# Patient Record
Sex: FEMALE | Race: BLACK OR AFRICAN AMERICAN | NOT HISPANIC OR LATINO | URBAN - METROPOLITAN AREA
[De-identification: names, ages, dates, MRNs, and addresses within clinical notes are randomized per-mention and may not be internally consistent; named-entity substitution may affect disease eponyms.]

---

## 2019-12-04 ENCOUNTER — OUTPATIENT (OUTPATIENT)
Dept: OUTPATIENT SERVICES | Facility: HOSPITAL | Age: 69
LOS: 1 days | Discharge: HOME | End: 2019-12-04
Payer: OTHER MISCELLANEOUS

## 2019-12-04 VITALS
HEART RATE: 85 BPM | RESPIRATION RATE: 17 BRPM | DIASTOLIC BLOOD PRESSURE: 86 MMHG | WEIGHT: 170.86 LBS | HEIGHT: 63 IN | OXYGEN SATURATION: 98 % | TEMPERATURE: 98 F | SYSTOLIC BLOOD PRESSURE: 150 MMHG

## 2019-12-04 DIAGNOSIS — Z98.890 OTHER SPECIFIED POSTPROCEDURAL STATES: Chronic | ICD-10-CM

## 2019-12-04 DIAGNOSIS — Z01.818 ENCOUNTER FOR OTHER PREPROCEDURAL EXAMINATION: ICD-10-CM

## 2019-12-04 DIAGNOSIS — Z98.49 CATARACT EXTRACTION STATUS, UNSPECIFIED EYE: Chronic | ICD-10-CM

## 2019-12-04 DIAGNOSIS — M19.90 UNSPECIFIED OSTEOARTHRITIS, UNSPECIFIED SITE: ICD-10-CM

## 2019-12-04 LAB
ANION GAP SERPL CALC-SCNC: 6 MMOL/L — SIGNIFICANT CHANGE UP (ref 5–17)
APTT BLD: 30.8 SEC — SIGNIFICANT CHANGE UP (ref 28.5–37)
BASOPHILS # BLD AUTO: 0.02 K/UL — SIGNIFICANT CHANGE UP (ref 0–0.2)
BASOPHILS NFR BLD AUTO: 0.2 % — SIGNIFICANT CHANGE UP (ref 0–2)
BUN SERPL-MCNC: 20 MG/DL — SIGNIFICANT CHANGE UP (ref 7–23)
CALCIUM SERPL-MCNC: 9.5 MG/DL — SIGNIFICANT CHANGE UP (ref 8.5–10.1)
CHLORIDE SERPL-SCNC: 106 MMOL/L — SIGNIFICANT CHANGE UP (ref 96–108)
CO2 SERPL-SCNC: 30 MMOL/L — SIGNIFICANT CHANGE UP (ref 22–31)
CREAT SERPL-MCNC: 1.06 MG/DL — SIGNIFICANT CHANGE UP (ref 0.5–1.3)
EOSINOPHIL # BLD AUTO: 0.33 K/UL — SIGNIFICANT CHANGE UP (ref 0–0.5)
EOSINOPHIL NFR BLD AUTO: 4.1 % — SIGNIFICANT CHANGE UP (ref 0–6)
GLUCOSE SERPL-MCNC: 120 MG/DL — HIGH (ref 70–99)
HBA1C BLD-MCNC: 6 % — HIGH (ref 4–5.6)
HCT VFR BLD CALC: 39.1 % — SIGNIFICANT CHANGE UP (ref 34.5–45)
HCV AB S/CO SERPL IA: 0.08 S/CO — SIGNIFICANT CHANGE UP (ref 0–0.99)
HCV AB SERPL-IMP: SIGNIFICANT CHANGE UP
HGB BLD-MCNC: 12.4 G/DL — SIGNIFICANT CHANGE UP (ref 11.5–15.5)
IMM GRANULOCYTES NFR BLD AUTO: 0.4 % — SIGNIFICANT CHANGE UP (ref 0–1.5)
INR BLD: 0.98 RATIO — SIGNIFICANT CHANGE UP (ref 0.88–1.16)
LYMPHOCYTES # BLD AUTO: 2.77 K/UL — SIGNIFICANT CHANGE UP (ref 1–3.3)
LYMPHOCYTES # BLD AUTO: 34.4 % — SIGNIFICANT CHANGE UP (ref 13–44)
MCHC RBC-ENTMCNC: 26.8 PG — LOW (ref 27–34)
MCHC RBC-ENTMCNC: 31.7 GM/DL — LOW (ref 32–36)
MCV RBC AUTO: 84.6 FL — SIGNIFICANT CHANGE UP (ref 80–100)
MONOCYTES # BLD AUTO: 0.78 K/UL — SIGNIFICANT CHANGE UP (ref 0–0.9)
MONOCYTES NFR BLD AUTO: 9.7 % — SIGNIFICANT CHANGE UP (ref 2–14)
MRSA PCR RESULT.: SIGNIFICANT CHANGE UP
NEUTROPHILS # BLD AUTO: 4.13 K/UL — SIGNIFICANT CHANGE UP (ref 1.8–7.4)
NEUTROPHILS NFR BLD AUTO: 51.2 % — SIGNIFICANT CHANGE UP (ref 43–77)
NRBC # BLD: 0 /100 WBCS — SIGNIFICANT CHANGE UP (ref 0–0)
PLATELET # BLD AUTO: 223 K/UL — SIGNIFICANT CHANGE UP (ref 150–400)
POTASSIUM SERPL-MCNC: 3.3 MMOL/L — LOW (ref 3.5–5.3)
POTASSIUM SERPL-SCNC: 3.3 MMOL/L — LOW (ref 3.5–5.3)
PROTHROM AB SERPL-ACNC: 11 SEC — SIGNIFICANT CHANGE UP (ref 10–12.9)
RBC # BLD: 4.62 M/UL — SIGNIFICANT CHANGE UP (ref 3.8–5.2)
RBC # FLD: 15.5 % — HIGH (ref 10.3–14.5)
S AUREUS DNA NOSE QL NAA+PROBE: SIGNIFICANT CHANGE UP
SODIUM SERPL-SCNC: 142 MMOL/L — SIGNIFICANT CHANGE UP (ref 135–145)
WBC # BLD: 8.06 K/UL — SIGNIFICANT CHANGE UP (ref 3.8–10.5)
WBC # FLD AUTO: 8.06 K/UL — SIGNIFICANT CHANGE UP (ref 3.8–10.5)

## 2019-12-04 PROCEDURE — 93010 ELECTROCARDIOGRAM REPORT: CPT

## 2019-12-04 NOTE — PHYSICAL THERAPY INITIAL EVALUATION ADULT - MODIFIED CLINICAL TEST OF SENSORY INTEGRATION IN BALANCE TEST
5x Sit to Stand Test : 45 seconds, 2 Minute Walk Test : 120 feet with lofstran crutches and R knee brace.

## 2019-12-04 NOTE — PHYSICAL THERAPY INITIAL EVALUATION ADULT - CRITERIA FOR SKILLED THERAPEUTIC INTERVENTIONS
functional limitations in following categories/risk reduction/prevention/therapy frequency/impairments found/rehab potential/anticipated discharge recommendation/anticipated equipment needs at discharge

## 2019-12-04 NOTE — PHYSICAL THERAPY INITIAL EVALUATION ADULT - GAIT DEVIATIONS NOTED, PT EVAL
increased time in double stance/decreased stride length/decreased velocity of limb motion/decreased weight-shifting ability/decreased step length/decreased tom

## 2019-12-04 NOTE — H&P PST ADULT - NSANTHOSAYNRD_GEN_A_CORE
No. LAKESHIA screening performed.  STOP BANG Legend: 0-2 = LOW Risk; 3-4 = INTERMEDIATE Risk; 5-8 = HIGH Risk

## 2019-12-04 NOTE — H&P PST ADULT - HISTORY OF PRESENT ILLNESS
70 yo female c/o right knee pain secondary to work injury 4/2019 - patient ambulates with crutches - scheduled for right knee arthroplasty  Goal: to be able to walk straight

## 2019-12-04 NOTE — PHYSICAL THERAPY INITIAL EVALUATION ADULT - IMPAIRMENTS FOUND, PT EVAL
ergonomics and body mechanics/posture/aerobic capacity/endurance/gait, locomotion, and balance/muscle strength/ROM

## 2019-12-04 NOTE — H&P PST ADULT - NSICDXPROBLEM_GEN_ALL_CORE_FT
PROBLEM DIAGNOSES  Problem: Osteoarthritis  Assessment and Plan: scheduled for right knee arthroplasty    Problem: Preoperative examination  Assessment and Plan: labs - cbc,pt/ptt,bmp,t&s,nose cx,ekg  M/C required  preop 3 day hibiclens instruction reviewed and given .instructed on if  nose cx positive use mupuricin 5 days and checklist given  take routine meds DOS with sips of water. avoid NSAID and OTC supplements. verbalized understanding  information on proper nutrition , increase protein and better food choices provided in packet

## 2019-12-04 NOTE — H&P PST ADULT - ASSESSMENT
right knee osteoarthritis  CAPRINI SCORE    AGE RELATED RISK FACTORS                                                       MOBILITY RELATED FACTORS  [ ] Age 41-60 years                                            (1 Point)                  [ ] Bed rest                                                        (1 Point)  [x ] Age: 61-74 years                                           (2 Points)                [ ] Plaster cast                                                   (2 Points)  [ ] Age= 75 years                                              (3 Points)                 [ ] Bed bound for more than 72 hours                   (2 Points)    DISEASE RELATED RISK FACTORS                                               GENDER SPECIFIC FACTORS  [ ] Edema in the lower extremities                       (1 Point)                  [ ] Pregnancy                                                     (1 Point)  [ ] Varicose veins                                               (1 Point)                  [ ] Post-partum < 6 weeks                                   (1 Point)             [x ] BMI > 25 Kg/m2                                            (1 Point)                  [ ] Hormonal therapy  or oral contraception            (1 Point)                 [ ] Sepsis (in the previous month)                        (1 Point)                  [ ] History of pregnancy complications  [ ] Pneumonia or serious lung disease                                               [ ] Unexplained or recurrent                       (1 Point)           (in the previous month)                               (1 Point)  [ ] Abnormal pulmonary function test                     (1 Point)                 SURGERY RELATED RISK FACTORS  [ ] Acute myocardial infarction                              (1 Point)                 [ ]  Section                                            (1 Point)  [ ] Congestive heart failure (in the previous month)  (1 Point)                 [ ] Minor surgery                                                 (1 Point)   [ ] Inflammatory bowel disease                             (1 Point)                 [ ] Arthroscopic surgery                                        (2 Points)  [ ] Central venous access                                    (2 Points)                [ ] General surgery lasting more than 45 minutes   (2 Points)       [ ] Stroke (in the previous month)                          (5 Points)               x[ ] Elective arthroplasty                                        (5 Points)                                                                                                                                               HEMATOLOGY RELATED FACTORS                                                 TRAUMA RELATED RISK FACTORS  [ ] Prior episodes of VTE                                     (3 Points)                 [ ] Fracture of the hip, pelvis, or leg                       (5 Points)  [ ] Positive family history for VTE                         (3 Points)                 [ ] Acute spinal cord injury (in the previous month)  (5 Points)  [ ] Prothrombin 72683 A                                      (3 Points)                 [ ] Paralysis  (less than 1 month)                          (5 Points)  [ ] Factor V Leiden                                             (3 Points)                 [ ] Multiple Trauma within 1 month                         (5 Points)  [ ] Lupus anticoagulants                                     (3 Points)                                                           [ ] Anticardiolipin antibodies                                (3 Points)                                                       [ ] High homocysteine in the blood                      (3 Points)                                             [ ] Other congenital or acquired thrombophilia       (3 Points)                                                [ ] Heparin induced thrombocytopenia                  (3 Points)                                          Total Score [  8        ]  Caprini score indicates that the patient is high risk for VTE event ( score 6 or greater). Surgical patient's in this group will benefit from both pharmacologic prophylaxsis and intermittent compression devices . Surgical team will determine the balance between VTE  risk and bleeding risk and other clinical considerations

## 2019-12-04 NOTE — PHYSICAL THERAPY INITIAL EVALUATION ADULT - ADDITIONAL COMMENTS
There is one step, c no rail but c door handle on the L to support, at the entry of the house and no steps to negotiate at home. Pt has a tub/shower combo c fixed shower head and regular toilet seat in BR. 3-1 commode will fit in BR. Pt is R handed and pt do not drive. Average pain level is 9/10 and pain increases to 10/10 with stairs negotiation and prolonged standing, ambulation. Pt takes Tylenol for pain control as needed. Pt stopped out-pt P.T. about 3 months ago. Pt fell once about 2 months ago. Pt has lofstran crutches and a straight cane and they are in good working condition. Son will be available to assist pt in post -op care upon discharge home.

## 2019-12-04 NOTE — H&P PST ADULT - NSICDXPASTSURGICALHX_GEN_ALL_CORE_FT
PAST SURGICAL HISTORY:  H/O breast biopsy left breast - benign lump    H/O knee surgery right    H/O shoulder surgery right    S/P cataract surgery left

## 2020-12-04 ENCOUNTER — OUTPATIENT (OUTPATIENT)
Dept: OUTPATIENT SERVICES | Facility: HOSPITAL | Age: 70
LOS: 1 days | Discharge: HOME | End: 2020-12-04
Payer: OTHER MISCELLANEOUS

## 2020-12-04 VITALS
DIASTOLIC BLOOD PRESSURE: 90 MMHG | HEIGHT: 63 IN | RESPIRATION RATE: 16 BRPM | WEIGHT: 171.52 LBS | OXYGEN SATURATION: 97 % | HEART RATE: 83 BPM | SYSTOLIC BLOOD PRESSURE: 141 MMHG | TEMPERATURE: 98 F

## 2020-12-04 DIAGNOSIS — Z98.890 OTHER SPECIFIED POSTPROCEDURAL STATES: Chronic | ICD-10-CM

## 2020-12-04 DIAGNOSIS — M17.11 UNILATERAL PRIMARY OSTEOARTHRITIS, RIGHT KNEE: ICD-10-CM

## 2020-12-04 DIAGNOSIS — Z98.49 CATARACT EXTRACTION STATUS, UNSPECIFIED EYE: Chronic | ICD-10-CM

## 2020-12-04 DIAGNOSIS — Z01.818 ENCOUNTER FOR OTHER PREPROCEDURAL EXAMINATION: ICD-10-CM

## 2020-12-04 DIAGNOSIS — I10 ESSENTIAL (PRIMARY) HYPERTENSION: ICD-10-CM

## 2020-12-04 LAB
A1C WITH ESTIMATED AVERAGE GLUCOSE RESULT: 6.3 % — HIGH (ref 4–5.6)
ANION GAP SERPL CALC-SCNC: 7 MMOL/L — SIGNIFICANT CHANGE UP (ref 5–17)
APTT BLD: 34.3 SEC — SIGNIFICANT CHANGE UP (ref 27.5–35.5)
BLD GP AB SCN SERPL QL: SIGNIFICANT CHANGE UP
BUN SERPL-MCNC: 19 MG/DL — SIGNIFICANT CHANGE UP (ref 7–23)
CALCIUM SERPL-MCNC: 9.3 MG/DL — SIGNIFICANT CHANGE UP (ref 8.5–10.1)
CHLORIDE SERPL-SCNC: 106 MMOL/L — SIGNIFICANT CHANGE UP (ref 96–108)
CO2 SERPL-SCNC: 29 MMOL/L — SIGNIFICANT CHANGE UP (ref 22–31)
CREAT SERPL-MCNC: 0.97 MG/DL — SIGNIFICANT CHANGE UP (ref 0.5–1.3)
ESTIMATED AVERAGE GLUCOSE: 134 MG/DL — HIGH (ref 68–114)
GLUCOSE SERPL-MCNC: 103 MG/DL — HIGH (ref 70–99)
HCT VFR BLD CALC: 40.9 % — SIGNIFICANT CHANGE UP (ref 34.5–45)
HCV AB S/CO SERPL IA: 0.06 S/CO — SIGNIFICANT CHANGE UP (ref 0–0.99)
HCV AB SERPL-IMP: SIGNIFICANT CHANGE UP
HGB BLD-MCNC: 12.9 G/DL — SIGNIFICANT CHANGE UP (ref 11.5–15.5)
INR BLD: 0.99 RATIO — SIGNIFICANT CHANGE UP (ref 0.88–1.16)
MCHC RBC-ENTMCNC: 27.2 PG — SIGNIFICANT CHANGE UP (ref 27–34)
MCHC RBC-ENTMCNC: 31.5 GM/DL — LOW (ref 32–36)
MCV RBC AUTO: 86.1 FL — SIGNIFICANT CHANGE UP (ref 80–100)
MRSA PCR RESULT.: SIGNIFICANT CHANGE UP
NRBC # BLD: 0 /100 WBCS — SIGNIFICANT CHANGE UP (ref 0–0)
PLATELET # BLD AUTO: 228 K/UL — SIGNIFICANT CHANGE UP (ref 150–400)
POTASSIUM SERPL-MCNC: 3.6 MMOL/L — SIGNIFICANT CHANGE UP (ref 3.5–5.3)
POTASSIUM SERPL-SCNC: 3.6 MMOL/L — SIGNIFICANT CHANGE UP (ref 3.5–5.3)
PROTHROM AB SERPL-ACNC: 11.5 SEC — SIGNIFICANT CHANGE UP (ref 10.6–13.6)
RBC # BLD: 4.75 M/UL — SIGNIFICANT CHANGE UP (ref 3.8–5.2)
RBC # FLD: 16 % — HIGH (ref 10.3–14.5)
S AUREUS DNA NOSE QL NAA+PROBE: SIGNIFICANT CHANGE UP
SODIUM SERPL-SCNC: 142 MMOL/L — SIGNIFICANT CHANGE UP (ref 135–145)
WBC # BLD: 7.96 K/UL — SIGNIFICANT CHANGE UP (ref 3.8–10.5)
WBC # FLD AUTO: 7.96 K/UL — SIGNIFICANT CHANGE UP (ref 3.8–10.5)

## 2020-12-04 PROCEDURE — 93010 ELECTROCARDIOGRAM REPORT: CPT

## 2020-12-04 RX ORDER — SODIUM CHLORIDE 9 MG/ML
3 INJECTION INTRAMUSCULAR; INTRAVENOUS; SUBCUTANEOUS EVERY 8 HOURS
Refills: 0 | Status: DISCONTINUED | OUTPATIENT
Start: 2021-01-06 | End: 2021-01-07

## 2020-12-04 NOTE — OCCUPATIONAL THERAPY INITIAL EVALUATION ADULT - ADDITIONAL COMMENTS
Patient was here at [facility] on December 4, 2019 for PRE-OP testing. Patient surgery was cancelled due to COVID-19. Patient lives with son (Who can assist post op) in a private house with 2 steps to enter with no rails. Once inside, the patient main bedroom and bathroom is on that floor when entering. The patients bathroom has a tub/shower combination, fixed shower head, standard toilet seat and no grab bars. The patient reports that a 3/1 commode can fit over the toilet at home. The patient ambulates with lofstrand crutches and a knee brace all the time and owns a cane. The patient daily pain at rest and with movement is a 10/10. The patient manages the pain with ice and with Alieve/Motrin. The patient is allergic to vancomycin. The patient has no recent outpatient PT, no recent falls and reports buckling infrequently. The patient does not wear glasses, R handed, does not drive and has no hearing impairments.

## 2020-12-04 NOTE — OCCUPATIONAL THERAPY INITIAL EVALUATION ADULT - MUSCLE TONE ASSESSMENT, REHAB EVAL
bilateral upper extremities/normal
no back pain, no gout, no musculoskeletal pain, no neck pain, and no weakness.

## 2020-12-04 NOTE — H&P PST ADULT - HISTORY OF PRESENT ILLNESS
70F [facility] htn c/o --- 2/2 ---- here for PST for scheduled ----  This patient denies any fever, cough, sob, flu like symptoms or travel outside of the [place] in the past 30 days 70F [facility] htn reports injury while at work on 04- now c/o right knee pain 2/2 unilateral primary here for PST for scheduled Right total knee replacement  This patient denies any fever, cough, sob, flu like symptoms or travel outside of the [place] in the past 30 days

## 2020-12-04 NOTE — PHYSICAL THERAPY INITIAL EVALUATION ADULT - IMPAIRMENTS CONTRIBUTING TO GAIT DEVIATIONS, PT EVAL
decreased flexibility/pain/decreased strength pain/decreased flexibility/decreased ROM/impaired balance/decreased strength

## 2020-12-04 NOTE — PHYSICAL THERAPY INITIAL EVALUATION ADULT - PERTINENT HX OF CURRENT PROBLEM, REHAB EVAL
Pt with chronic R knee pain, instability, and reduced functional mobility. Pt is scheduled for elective total joint replacement on 12/16/20  by  Dr. Levine

## 2020-12-04 NOTE — PHYSICAL THERAPY INITIAL EVALUATION ADULT - GAIT DEVIATIONS NOTED, PT EVAL
decreased weight-shifting ability/decreased tom/decreased stride length/decreased step length decreased weight-shifting ability/decreased velocity of limb motion/decreased step length/decreased stride length/decreased tom

## 2020-12-04 NOTE — PHYSICAL THERAPY INITIAL EVALUATION ADULT - PATIENT/FAMILY AGREES WITH PLAN
yes/Home with home PT for home safety evaluation and to improve functional mobility and to prevent injury from fall.

## 2020-12-04 NOTE — PHYSICAL THERAPY INITIAL EVALUATION ADULT - TINETTI GAIT TEST, REHAB EVAL
Indication of gait - 1/1,   Step Length and height - 1/2,   Foot Clearance - 2/2,   Step Symmetry - 0/1,  Step Continuity - 1/1,   Path - 1/ 2,   Trunk - 1/2,   Walking Time -0/1,   Total Score - 7/12

## 2020-12-04 NOTE — PHYSICAL THERAPY INITIAL EVALUATION ADULT - CRITERIA FOR SKILLED THERAPEUTIC INTERVENTIONS
impairments found/therapy frequency/functional limitations in following categories/rehab potential/anticipated equipment needs at discharge/risk reduction/prevention/anticipated discharge recommendation

## 2020-12-04 NOTE — H&P PST ADULT - ASSESSMENT
CAPRINI SCORE    AGE RELATED RISK FACTORS                                                       MOBILITY RELATED FACTORS  [ ] Age 41-60 years                                            (1 Point)                  [ ] Bed rest                                                        (1 Point)  [ ] Age: 61-74 years                                           (2 Points)                [ ] Plaster cast                                                   (2 Points)  [ ] Age= 75 years                                              (3 Points)                 [ ] Bed bound for more than 72 hours                   (2 Points)    DISEASE RELATED RISK FACTORS                                               GENDER SPECIFIC FACTORS  [ ] Edema in the lower extremities                       (1 Point)                  [ ] Pregnancy                                                     (1 Point)  [ ] Varicose veins                                               (1 Point)                  [ ] Post-partum < 6 weeks                                   (1 Point)             [ ] BMI > 25 Kg/m2                                            (1 Point)                  [ ] Hormonal therapy  or oral contraception            (1 Point)                 [ ] Sepsis (in the previous month)                        (1 Point)                  [ ] History of pregnancy complications  [ ] Pneumonia or serious lung disease                                               [ ] Unexplained or recurrent                       (1 Point)           (in the previous month)                               (1 Point)  [ ] Abnormal pulmonary function test                     (1 Point)                 SURGERY RELATED RISK FACTORS  [ ] Acute myocardial infarction                              (1 Point)                 [ ]  Section                                            (1 Point)  [ ] Congestive heart failure (in the previous month)  (1 Point)                 [ ] Minor surgery                                                 (1 Point)   [ ] Inflammatory bowel disease                             (1 Point)                 [ ] Arthroscopic surgery                                        (2 Points)  [ ] Central venous access                                    (2 Points)                [ ] General surgery lasting more than 45 minutes   (2 Points)       [ ] Stroke (in the previous month)                          (5 Points)               [ ] Elective arthroplasty                                        (5 Points)                                                                                                                                               HEMATOLOGY RELATED FACTORS                                                 TRAUMA RELATED RISK FACTORS  [ ] Prior episodes of VTE                                     (3 Points)                 [ ] Fracture of the hip, pelvis, or leg                       (5 Points)  [ ] Positive family history for VTE                         (3 Points)                 [ ] Acute spinal cord injury (in the previous month)  (5 Points)  [ ] Prothrombin 97316 A                                      (3 Points)                 [ ] Paralysis  (less than 1 month)                          (5 Points)  [ ] Factor V Leiden                                             (3 Points)                 [ ] Multiple Trauma within 1 month                         (5 Points)  [ ] Lupus anticoagulants                                     (3 Points)                                                           [ ] Anticardiolipin antibodies                                (3 Points)                                                       [ ] High homocysteine in the blood                      (3 Points)                                             [ ] Other congenital or acquired thrombophilia       (3 Points)                                                [ ] Heparin induced thrombocytopenia                  (3 Points)                                          Total Score [          ] 70F [facility] htn reports injury while at work on 2019 now c/o right knee pain 2/2 unilateral primary here for PST for scheduled Right total knee replacement  CAPRINI SCORE    AGE RELATED RISK FACTORS                                                       MOBILITY RELATED FACTORS  [ ] Age 41-60 years                                            (1 Point)                  [ ] Bed rest                                                        (1 Point)  [x ] Age: 61-74 years                                           (2 Points)                [ ] Plaster cast                                                   (2 Points)  [ ] Age= 75 years                                              (3 Points)                 [ ] Bed bound for more than 72 hours                   (2 Points)    DISEASE RELATED RISK FACTORS                                               GENDER SPECIFIC FACTORS  [ ] Edema in the lower extremities                       (1 Point)                  [ ] Pregnancy                                                     (1 Point)  [ ] Varicose veins                                               (1 Point)                  [ ] Post-partum < 6 weeks                                   (1 Point)             [x ] BMI > 25 Kg/m2                                            (1 Point)                  [ ] Hormonal therapy  or oral contraception            (1 Point)                 [ ] Sepsis (in the previous month)                        (1 Point)                  [ ] History of pregnancy complications  [ ] Pneumonia or serious lung disease                                               [ ] Unexplained or recurrent                       (1 Point)           (in the previous month)                               (1 Point)  [ ] Abnormal pulmonary function test                     (1 Point)                 SURGERY RELATED RISK FACTORS  [ ] Acute myocardial infarction                              (1 Point)                 [ ]  Section                                            (1 Point)  [ ] Congestive heart failure (in the previous month)  (1 Point)                 [ ] Minor surgery                                                 (1 Point)   [ ] Inflammatory bowel disease                             (1 Point)                 [ ] Arthroscopic surgery                                        (2 Points)  [ ] Central venous access                                    (2 Points)                [ ] General surgery lasting more than 45 minutes   (2 Points)       [ ] Stroke (in the previous month)                          (5 Points)               [x ] Elective arthroplasty                                        (5 Points)                                                                                                                                               HEMATOLOGY RELATED FACTORS                                                 TRAUMA RELATED RISK FACTORS  [ ] Prior episodes of VTE                                     (3 Points)                 [ ] Fracture of the hip, pelvis, or leg                       (5 Points)  [ ] Positive family history for VTE                         (3 Points)                 [ ] Acute spinal cord injury (in the previous month)  (5 Points)  [ ] Prothrombin 61242 A                                      (3 Points)                 [ ] Paralysis  (less than 1 month)                          (5 Points)  [ ] Factor V Leiden                                             (3 Points)                 [ ] Multiple Trauma within 1 month                         (5 Points)  [ ] Lupus anticoagulants                                     (3 Points)                                                           [ ] Anticardiolipin antibodies                                (3 Points)                                                       [ ] High homocysteine in the blood                      (3 Points)                                             [ ] Other congenital or acquired thrombophilia       (3 Points)                                                [ ] Heparin induced thrombocytopenia                  (3 Points)                                          Total Score [       8   ]

## 2020-12-04 NOTE — H&P PST ADULT - NSICDXPROBLEM_GEN_ALL_CORE_FT
PROBLEM DIAGNOSES  Problem: Unilateral primary osteoarthritis, right knee  Assessment and Plan: Right total knee replacement  labs - cbc,pt/ptt,bmp,t&s,nose cx,ekg  M/C required  preop 3 day hibiclens instruction reviewed and given .instructed on if  nose cx positive use mupuricin 5 days and checklist given  take routine meds DOS with sips of water. avoid NSAID and OTC supplements. verbalized understanding  information on proper nutrition , increase protein and better food choices provided in packet   ensure clear held 2/2 lactose intolerance    Problem: HTN (hypertension)  Assessment and Plan: Continue current regimen and medications.

## 2020-12-04 NOTE — OCCUPATIONAL THERAPY INITIAL EVALUATION ADULT - PERTINENT HX OF CURRENT PROBLEM, REHAB EVAL
R knee OA which impacts patients ability to perform functional tasks and transfers/mobility. Patient is scheduled for R TKA on 12/16/20.

## 2020-12-04 NOTE — PHYSICAL THERAPY INITIAL EVALUATION ADULT - ACTIVE RANGE OF MOTION EXAMINATION, REHAB EVAL
bilateral upper extremity Active ROM was WFL (within functional limits)/bilateral  lower extremity Active ROM was WFL (within functional limits) deficits as listed below/bilateral  lower extremity Active ROM was WFL (within functional limits)/bilateral upper extremity Active ROM was WFL (within functional limits)/R knee flexion 10 -80 degrees c knee brace donned.

## 2020-12-04 NOTE — PHYSICAL THERAPY INITIAL EVALUATION ADULT - IMPAIRED TRANSFERS: SIT/STAND, REHAB EVAL
decreased flexibility/decreased strength/pain pain/decreased flexibility/decreased ROM/decreased strength

## 2020-12-04 NOTE — PHYSICAL THERAPY INITIAL EVALUATION ADULT - TINETTI BALANCE TEST, REHAB EVAL
Sitting Balance - 1/1 , Rises From Chair - 1/2, Attempts to rise - 2/2 , Immediate Standing Balance - 2/2,  Standing Balance - 2/2, Nudged -  0/2, Eyes Closed - 0/1,  Turning 360 Deg -  1/2, Sitting down - 1/2, Balance Score - 10/16

## 2020-12-04 NOTE — PHYSICAL THERAPY INITIAL EVALUATION ADULT - GENERAL OBSERVATIONS, REHAB EVAL
Patient was seen seated in [facility] waiting area, +Silvio Lofstrand crutches, +R knee hinge brace, +R knee genu varum c no apparent distress. Height: 5'3"  Weight: 160 lbs

## 2020-12-04 NOTE — PHYSICAL THERAPY INITIAL EVALUATION ADULT - ADDITIONAL COMMENTS
Pt lives in a private home c 2 steps to enter, no railing. Inside pt has no steps to negotiate. Bathroom has tub-shower, fixed shower head, and standard toilet seat, can fit 3:1 commode over it. Pt owns Lofstrand crutches and cane. Average pain level at rest is 10/10. Pain increases to 10/10 c sit to stand transfer, prolonged standing, ambulation, and stairs negotiation. Pt uses ice and Aleve or Motrin 1x/day to relieve pain. Pt is currently not receiving outpatient PT, denies h/o falls, c/o infrequent buckling in the knee. Does not wear eyeglasses, is R hand dominant, is currently not driving, and denies use of hearing aids.

## 2020-12-04 NOTE — H&P PST ADULT - SKIN/BREAST
Call placed to mom to remind of Thyroid labs needing completion as it has been 2 months.  Lab orders placed mom states understanding.   
negative

## 2020-12-13 ENCOUNTER — APPOINTMENT (OUTPATIENT)
Dept: DISASTER EMERGENCY | Facility: CLINIC | Age: 70
End: 2020-12-13

## 2020-12-13 PROBLEM — Z00.00 ENCOUNTER FOR PREVENTIVE HEALTH EXAMINATION: Status: ACTIVE | Noted: 2020-12-13

## 2020-12-15 LAB — SARS-COV-2 N GENE NPH QL NAA+PROBE: NOT DETECTED

## 2020-12-15 RX ORDER — OXYCODONE HYDROCHLORIDE 5 MG/1
10 TABLET ORAL EVERY 4 HOURS
Refills: 0 | Status: DISCONTINUED | OUTPATIENT
Start: 2021-01-06 | End: 2021-01-07

## 2020-12-15 RX ORDER — ASCORBIC ACID 60 MG
500 TABLET,CHEWABLE ORAL
Refills: 0 | Status: DISCONTINUED | OUTPATIENT
Start: 2021-01-06 | End: 2021-01-07

## 2020-12-15 RX ORDER — MORPHINE SULFATE 50 MG/1
4 CAPSULE, EXTENDED RELEASE ORAL ONCE
Refills: 0 | Status: DISCONTINUED | OUTPATIENT
Start: 2021-01-06 | End: 2021-01-06

## 2020-12-15 RX ORDER — ONDANSETRON 8 MG/1
4 TABLET, FILM COATED ORAL EVERY 6 HOURS
Refills: 0 | Status: DISCONTINUED | OUTPATIENT
Start: 2021-01-06 | End: 2021-01-07

## 2020-12-15 RX ORDER — LANOLIN ALCOHOL/MO/W.PET/CERES
3 CREAM (GRAM) TOPICAL AT BEDTIME
Refills: 0 | Status: DISCONTINUED | OUTPATIENT
Start: 2021-01-06 | End: 2021-01-07

## 2020-12-15 RX ORDER — LISINOPRIL 2.5 MG/1
40 TABLET ORAL DAILY
Refills: 0 | Status: DISCONTINUED | OUTPATIENT
Start: 2021-01-06 | End: 2021-01-07

## 2020-12-15 RX ORDER — AMLODIPINE BESYLATE 2.5 MG/1
5 TABLET ORAL DAILY
Refills: 0 | Status: DISCONTINUED | OUTPATIENT
Start: 2021-01-06 | End: 2021-01-07

## 2020-12-15 RX ORDER — ACETAMINOPHEN 500 MG
650 TABLET ORAL EVERY 6 HOURS
Refills: 0 | Status: DISCONTINUED | OUTPATIENT
Start: 2021-01-06 | End: 2021-01-07

## 2020-12-15 RX ORDER — MAGNESIUM HYDROXIDE 400 MG/1
30 TABLET, CHEWABLE ORAL DAILY
Refills: 0 | Status: DISCONTINUED | OUTPATIENT
Start: 2021-01-06 | End: 2021-01-07

## 2020-12-15 RX ORDER — BENZOCAINE AND MENTHOL 5; 1 G/100ML; G/100ML
1 LIQUID ORAL EVERY 4 HOURS
Refills: 0 | Status: DISCONTINUED | OUTPATIENT
Start: 2021-01-06 | End: 2021-01-06

## 2020-12-15 RX ORDER — ASPIRIN/CALCIUM CARB/MAGNESIUM 324 MG
81 TABLET ORAL
Refills: 0 | Status: DISCONTINUED | OUTPATIENT
Start: 2021-01-07 | End: 2021-01-07

## 2020-12-15 RX ORDER — POLYETHYLENE GLYCOL 3350 17 G/17G
17 POWDER, FOR SOLUTION ORAL DAILY
Refills: 0 | Status: DISCONTINUED | OUTPATIENT
Start: 2021-01-06 | End: 2021-01-07

## 2020-12-15 RX ORDER — OXYCODONE HYDROCHLORIDE 5 MG/1
5 TABLET ORAL EVERY 4 HOURS
Refills: 0 | Status: DISCONTINUED | OUTPATIENT
Start: 2021-01-06 | End: 2021-01-07

## 2020-12-30 PROBLEM — I10 ESSENTIAL (PRIMARY) HYPERTENSION: Chronic | Status: ACTIVE | Noted: 2020-12-04

## 2021-01-03 ENCOUNTER — APPOINTMENT (OUTPATIENT)
Dept: DISASTER EMERGENCY | Facility: CLINIC | Age: 71
End: 2021-01-03

## 2021-01-03 DIAGNOSIS — Z01.818 ENCOUNTER FOR OTHER PREPROCEDURAL EXAMINATION: ICD-10-CM

## 2021-01-04 LAB — SARS-COV-2 N GENE NPH QL NAA+PROBE: NOT DETECTED

## 2021-01-05 ENCOUNTER — TRANSCRIPTION ENCOUNTER (OUTPATIENT)
Age: 71
End: 2021-01-05

## 2021-01-05 RX ORDER — ACETAMINOPHEN 500 MG
1 TABLET ORAL
Qty: 0 | Refills: 0 | DISCHARGE

## 2021-01-05 RX ORDER — HYDROMORPHONE HYDROCHLORIDE 2 MG/ML
0.5 INJECTION INTRAMUSCULAR; INTRAVENOUS; SUBCUTANEOUS EVERY 4 HOURS
Refills: 0 | Status: DISCONTINUED | OUTPATIENT
Start: 2021-01-06 | End: 2021-01-07

## 2021-01-05 RX ORDER — PANTOPRAZOLE SODIUM 20 MG/1
40 TABLET, DELAYED RELEASE ORAL
Refills: 0 | Status: DISCONTINUED | OUTPATIENT
Start: 2021-01-06 | End: 2021-01-07

## 2021-01-05 RX ORDER — CELECOXIB 200 MG/1
200 CAPSULE ORAL EVERY 12 HOURS
Refills: 0 | Status: DISCONTINUED | OUTPATIENT
Start: 2021-01-07 | End: 2021-01-07

## 2021-01-05 RX ORDER — ACETAMINOPHEN 500 MG
1000 TABLET ORAL ONCE
Refills: 0 | Status: DISCONTINUED | OUTPATIENT
Start: 2021-01-06 | End: 2021-01-07

## 2021-01-05 RX ORDER — SODIUM CHLORIDE 9 MG/ML
1000 INJECTION, SOLUTION INTRAVENOUS
Refills: 0 | Status: DISCONTINUED | OUTPATIENT
Start: 2021-01-06 | End: 2021-01-07

## 2021-01-05 RX ORDER — SENNA PLUS 8.6 MG/1
2 TABLET ORAL AT BEDTIME
Refills: 0 | Status: DISCONTINUED | OUTPATIENT
Start: 2021-01-06 | End: 2021-01-07

## 2021-01-06 ENCOUNTER — TRANSCRIPTION ENCOUNTER (OUTPATIENT)
Age: 71
End: 2021-01-06

## 2021-01-06 ENCOUNTER — INPATIENT (INPATIENT)
Facility: HOSPITAL | Age: 71
LOS: 0 days | Discharge: HOME HEALTH | End: 2021-01-07
Attending: ORTHOPAEDIC SURGERY | Admitting: ORTHOPAEDIC SURGERY
Payer: OTHER MISCELLANEOUS

## 2021-01-06 ENCOUNTER — RESULT REVIEW (OUTPATIENT)
Age: 71
End: 2021-01-06

## 2021-01-06 VITALS
SYSTOLIC BLOOD PRESSURE: 121 MMHG | WEIGHT: 171.52 LBS | OXYGEN SATURATION: 100 % | HEART RATE: 99 BPM | TEMPERATURE: 99 F | DIASTOLIC BLOOD PRESSURE: 88 MMHG | HEIGHT: 63 IN

## 2021-01-06 DIAGNOSIS — Z98.890 OTHER SPECIFIED POSTPROCEDURAL STATES: Chronic | ICD-10-CM

## 2021-01-06 DIAGNOSIS — Z98.49 CATARACT EXTRACTION STATUS, UNSPECIFIED EYE: Chronic | ICD-10-CM

## 2021-01-06 LAB
GLUCOSE BLDC GLUCOMTR-MCNC: 118 MG/DL — HIGH (ref 70–99)
HCT VFR BLD CALC: 38 % — SIGNIFICANT CHANGE UP (ref 34.5–45)
HGB BLD-MCNC: 12 G/DL — SIGNIFICANT CHANGE UP (ref 11.5–15.5)
MCHC RBC-ENTMCNC: 26.8 PG — LOW (ref 27–34)
MCHC RBC-ENTMCNC: 31.6 GM/DL — LOW (ref 32–36)
MCV RBC AUTO: 85 FL — SIGNIFICANT CHANGE UP (ref 80–100)
NRBC # BLD: 0 /100 WBCS — SIGNIFICANT CHANGE UP (ref 0–0)
PLATELET # BLD AUTO: 194 K/UL — SIGNIFICANT CHANGE UP (ref 150–400)
RBC # BLD: 4.47 M/UL — SIGNIFICANT CHANGE UP (ref 3.8–5.2)
RBC # FLD: 16 % — HIGH (ref 10.3–14.5)
WBC # BLD: 9.15 K/UL — SIGNIFICANT CHANGE UP (ref 3.8–10.5)
WBC # FLD AUTO: 9.15 K/UL — SIGNIFICANT CHANGE UP (ref 3.8–10.5)

## 2021-01-06 PROCEDURE — 73560 X-RAY EXAM OF KNEE 1 OR 2: CPT | Mod: 26,RT

## 2021-01-06 PROCEDURE — 88311 DECALCIFY TISSUE: CPT | Mod: 26

## 2021-01-06 PROCEDURE — 88305 TISSUE EXAM BY PATHOLOGIST: CPT | Mod: 26

## 2021-01-06 RX ORDER — SODIUM CHLORIDE 9 MG/ML
1000 INJECTION, SOLUTION INTRAVENOUS
Refills: 0 | Status: DISCONTINUED | OUTPATIENT
Start: 2021-01-06 | End: 2021-01-06

## 2021-01-06 RX ORDER — ACETAMINOPHEN 500 MG
650 TABLET ORAL ONCE
Refills: 0 | Status: COMPLETED | OUTPATIENT
Start: 2021-01-06 | End: 2021-01-06

## 2021-01-06 RX ORDER — CELECOXIB 200 MG/1
200 CAPSULE ORAL ONCE
Refills: 0 | Status: COMPLETED | OUTPATIENT
Start: 2021-01-06 | End: 2021-01-06

## 2021-01-06 RX ORDER — HYDROMORPHONE HYDROCHLORIDE 2 MG/ML
0.4 INJECTION INTRAMUSCULAR; INTRAVENOUS; SUBCUTANEOUS
Refills: 0 | Status: DISCONTINUED | OUTPATIENT
Start: 2021-01-06 | End: 2021-01-06

## 2021-01-06 RX ORDER — LISINOPRIL 2.5 MG/1
1 TABLET ORAL
Qty: 0 | Refills: 0 | DISCHARGE

## 2021-01-06 RX ORDER — ONDANSETRON 8 MG/1
4 TABLET, FILM COATED ORAL ONCE
Refills: 0 | Status: DISCONTINUED | OUTPATIENT
Start: 2021-01-06 | End: 2021-01-06

## 2021-01-06 RX ORDER — CEFAZOLIN SODIUM 1 G
2000 VIAL (EA) INJECTION EVERY 8 HOURS
Refills: 0 | Status: COMPLETED | OUTPATIENT
Start: 2021-01-06 | End: 2021-01-07

## 2021-01-06 RX ORDER — TRANEXAMIC ACID 100 MG/ML
1000 INJECTION, SOLUTION INTRAVENOUS ONCE
Refills: 0 | Status: COMPLETED | OUTPATIENT
Start: 2021-01-06 | End: 2021-01-06

## 2021-01-06 RX ORDER — DEXAMETHASONE 0.5 MG/5ML
10 ELIXIR ORAL ONCE
Refills: 0 | Status: COMPLETED | OUTPATIENT
Start: 2021-01-07 | End: 2021-01-07

## 2021-01-06 RX ORDER — AMLODIPINE BESYLATE 2.5 MG/1
1 TABLET ORAL
Qty: 0 | Refills: 0 | DISCHARGE

## 2021-01-06 RX ADMIN — SODIUM CHLORIDE 150 MILLILITER(S): 9 INJECTION, SOLUTION INTRAVENOUS at 18:17

## 2021-01-06 RX ADMIN — Medication 500 MILLIGRAM(S): at 18:23

## 2021-01-06 RX ADMIN — OXYCODONE HYDROCHLORIDE 10 MILLIGRAM(S): 5 TABLET ORAL at 17:06

## 2021-01-06 RX ADMIN — SODIUM CHLORIDE 3 MILLILITER(S): 9 INJECTION INTRAMUSCULAR; INTRAVENOUS; SUBCUTANEOUS at 07:23

## 2021-01-06 RX ADMIN — SODIUM CHLORIDE 150 MILLILITER(S): 9 INJECTION, SOLUTION INTRAVENOUS at 15:00

## 2021-01-06 RX ADMIN — Medication 650 MILLIGRAM(S): at 07:24

## 2021-01-06 RX ADMIN — Medication 100 MILLIGRAM(S): at 18:23

## 2021-01-06 RX ADMIN — TRANEXAMIC ACID 220 MILLIGRAM(S): 100 INJECTION, SOLUTION INTRAVENOUS at 12:39

## 2021-01-06 RX ADMIN — SODIUM CHLORIDE 3 MILLILITER(S): 9 INJECTION INTRAMUSCULAR; INTRAVENOUS; SUBCUTANEOUS at 22:05

## 2021-01-06 RX ADMIN — SODIUM CHLORIDE 75 MILLILITER(S): 9 INJECTION, SOLUTION INTRAVENOUS at 12:39

## 2021-01-06 RX ADMIN — CELECOXIB 200 MILLIGRAM(S): 200 CAPSULE ORAL at 07:24

## 2021-01-06 NOTE — PHYSICAL THERAPY INITIAL EVALUATION ADULT - GENERAL OBSERVATIONS, REHAB EVAL
Pt encountered supine in bed, A&Ox4, +pneumatic TEDs, +IV (removed prior to ambulation),  +C/D/I dressing to R knee, +BOOGIE, 5/10 R knee pain, NAD and comfortable.

## 2021-01-06 NOTE — DISCHARGE NOTE PROVIDER - NSDCFUADDINST_GEN_ALL_CORE_FT
Keep knee straight while at rest. Elevate the leg as much as possible ("toes above the nose") to help control swelling. Make sure you get up and take a brief walk every two hours to help with circulation and prevent stiffness. Incentive spirometer 10X/hour. Cryocuff to help with pain/inflammation.     Keep BOOGIE Dressing Clean, Dry and Intact. May shower with BOOGIE Dressing. Please do not scrub, soak, peel or pick at the BOOGIE dressing. No creams, lotions, or oils over dressing. May shower and let water run over dressing, no baths. Pat dry once out of shower. Dressing to be removed in 7 days. If dressing is saturated from border to border - may remove and replace with clean dry dressing.    Shower instructions for BOOGIE Dressing: Place battery pack in a water sealed bag (such as a Ziplock bag) and keep battery out of the water.   Alternately you can turn battery pack off (press orange button.) Twist tubing OFF battery pack before entering shower. Once done with showering. Pat dressing dry. Reconnect tubing and twist ON battery pack after you are dry. Then turn battery pack on (press orange button.)

## 2021-01-06 NOTE — DISCHARGE NOTE PROVIDER - NSDCMRMEDTOKEN_GEN_ALL_CORE_FT
amLODIPine 5 mg oral tablet: 1 tab(s) orally once a day  lisinopril 40 mg oral tablet: 1 tab(s) orally once a day   amLODIPine 5 mg oral tablet: 1 tab(s) orally once a day  ascorbic acid 500 mg oral tablet: 1 tab(s) orally 2 times a day  aspirin 81 mg oral delayed release tablet: 1 tab(s) orally 2 times a day MDD:2 Tabs for DVT Prophylaxis   celecoxib 200 mg oral capsule: 1 cap(s) orally every 12 hours MDD:2 Tabs  lisinopril 40 mg oral tablet: 1 tab(s) orally once a day  Multiple Vitamins oral tablet: 1 tab(s) orally once a day  oxyCODONE 5 mg oral tablet: 1 tab(s) orally every 4 hours, As needed MDD:12 Tabs  senna oral tablet: 2 tab(s) orally once a day (at bedtime)

## 2021-01-06 NOTE — PHYSICAL THERAPY INITIAL EVALUATION ADULT - GAIT TRAINING, PT EVAL
Patient will ambulate 500 feet with rolling walker independently for community ambulation in 2-3 days. Patient will ascend/descend 2 steps with bilateral lofstrand crutches independently in 2-3 days to safely navigate home environment.

## 2021-01-06 NOTE — DISCHARGE NOTE PROVIDER - HOSPITAL COURSE
70yFemale with history of Right knee OA presenting for R TKA by Dr. Levine on 1/6/2021. Risk and benefits of surgery were explained to the patient. The patient understood and agreed to proceed with surgery. Patient underwent the procedure with no intraoperative complications. Pt was brought in stable condition to the PACU. Once stable in PACU, pt was brought to the floor. During hospital stay pt was followed by Medicine, physical therapy, Home Care during this admission. Pt had an uneventful hospital course. Pt is stable for discharge to home 70yFemale with history of Right knee OA presenting for R TKA by Dr. Levine on 1/6/2021. Risk and benefits of surgery were explained to the patient. The patient understood and agreed to proceed with surgery. Patient underwent the procedure with no intraoperative complications. Pt was brought in stable condition to the PACU. Once stable in PACU, pt was brought to the floor. During hospital stay pt was followed by Medicine, physical therapy, Home Care during this admission. Pt had an uneventful hospital course. Pt is stable for discharge to home with Home Care Services and PT

## 2021-01-06 NOTE — ASU PREOP CHECKLIST - BMI (KG/M2)
30.4 Picato Counseling:  I discussed with the patient the risks of Picato including but not limited to erythema, scaling, itching, weeping, crusting, and pain.

## 2021-01-06 NOTE — DISCHARGE NOTE PROVIDER - NSDCFUADDAPPT_GEN_ALL_CORE_FT
Follow up with your surgeon in two weeks. Call for appointment.  If you need more pain medication, call your surgeon's office. For medication refills or authorizations, please call [phone]  We recommend that you call and schedule a follow up appointment within 2-4 weeks with your primary care physician for repeat blood work (CBC and BMP) for post hospital discharge follow-up care.  Call your surgeon if you have increased redness/pain/drainage or fever. Return to ER for shortness of breath/calf tenderness.

## 2021-01-06 NOTE — OCCUPATIONAL THERAPY INITIAL EVALUATION ADULT - GENERAL OBSERVATIONS, REHAB EVAL
Pt was encountered supine in bed; BOOGIE dressing, NAD, AXOX4; pt c/o pain s/p right TKA. Pt noted with c/o numbness to right LE s/p anesthesia, knee immobilizer donned to right knee to begin evaluation, pt able to perform transfers without immobilizer at end of evaluation.

## 2021-01-06 NOTE — OCCUPATIONAL THERAPY INITIAL EVALUATION ADULT - ADDITIONAL COMMENTS
Pre op assessment confirmed - Patient was here at [facility] on December 4, 2019 for PRE-OP testing. Patient surgery was cancelled due to COVID-19. Patient lives with son (Who can assist post op) in a private house with 2 steps to enter with no rails. Once inside, the patient main bedroom and bathroom is on that floor when entering. The patients bathroom has a tub/shower combination, fixed shower head, standard toilet seat and no grab bars. The patient reports that a 3/1 commode can fit over the toilet at home.

## 2021-01-06 NOTE — DISCHARGE NOTE PROVIDER - CARE PROVIDER_API CALL
Korey Levine  ORTHOPAEDIC SURGERY  [address] [place] [address]  Phone: [phone]  Fax: [phone]  Follow Up Time:

## 2021-01-06 NOTE — PHYSICAL THERAPY INITIAL EVALUATION ADULT - ACTIVE RANGE OF MOTION EXAMINATION, REHAB EVAL
R knee 0-70 degrees due to pain/bilateral upper extremity Active ROM was WFL (within functional limits)/bilateral  lower extremity Active ROM was WFL (within functional limits)

## 2021-01-06 NOTE — PHYSICAL THERAPY INITIAL EVALUATION ADULT - LEVEL OF INDEPENDENCE: SIT/SUPINE, REHAB EVAL
GEN: NAD, comfortable  CARDIO: RRR, no m/r/g  RESP: CTAB, no w/r/r  ABD: soft, NT/ND, +BS  EXT: no edema, pp b/l  NEURO: AAOx3, no facial asymmetry, LUE strength 3-4/5 noted, RUE + b/l LE 5/5, no sensory deficits noted, +ve imbalance on standing, unable to ambulate 2/2 dizziness supervision

## 2021-01-06 NOTE — CONSULT NOTE ADULT - SUBJECTIVE AND OBJECTIVE BOX
YUSEF RIGGS is a 70y Female s/p RIGHT TOTAL KNEE REPLACEMENT      w/ h/o HTN (hypertension)    No pertinent past medical history      denies any chest pain shortness of breath palpitation dizziness lightheadedness nausea vomiting fever or chills    H/O shoulder surgery    H/O knee surgery    S/P cataract surgery    H/O breast biopsy        SH: doesnot smoke or drink at this time    Chloromycetin (Anaphylaxis)  lactose (Stomach Upset)    acetaminophen   Tablet .. 650 milliGRAM(s) Oral every 6 hours PRN  acetaminophen  IVPB .. 1000 milliGRAM(s) IV Intermittent once  aluminum hydroxide/magnesium hydroxide/simethicone Suspension 30 milliLiter(s) Oral four times a day PRN  amLODIPine   Tablet 5 milliGRAM(s) Oral daily  ascorbic acid 500 milliGRAM(s) Oral two times a day  aspirin enteric coated 81 milliGRAM(s) Oral two times a day  ceFAZolin   IVPB 2000 milliGRAM(s) IV Intermittent every 8 hours  HYDROmorphone  Injectable 0.5 milliGRAM(s) IV Push every 4 hours PRN  lactated ringers. 1000 milliLiter(s) IV Continuous <Continuous>  lisinopril 40 milliGRAM(s) Oral daily  magnesium hydroxide Suspension 30 milliLiter(s) Oral daily PRN  melatonin 3 milliGRAM(s) Oral at bedtime PRN  multivitamin 1 Tablet(s) Oral daily  ondansetron Injectable 4 milliGRAM(s) IV Push every 6 hours PRN  oxyCODONE    IR 5 milliGRAM(s) Oral every 4 hours PRN  oxyCODONE    IR 10 milliGRAM(s) Oral every 4 hours PRN  pantoprazole    Tablet 40 milliGRAM(s) Oral before breakfast  polyethylene glycol 3350 17 Gram(s) Oral daily  senna 2 Tablet(s) Oral at bedtime  sodium chloride 0.9% lock flush 3 milliLiter(s) IV Push every 8 hours    T(C): 36.6 (01-06-21 @ 18:40), Max: 37 (01-06-21 @ 07:00)  HR: 101 (01-06-21 @ 18:40) (81 - 107)  BP: 138/75 (01-06-21 @ 18:40) (109/69 - 146/91)  RR: 20 (01-06-21 @ 18:40) (13 - 20)  SpO2: 99% (01-06-21 @ 18:40) (94% - 100%)  HEENT unremarkable  neck no JVD or bruit  heart normal S1 S2 RRR no gallops or rubs  chest clear to auscultation  abd sof nontender non distended +bs  ext no calf tenderness    A/P   DVT PX  pain control  bowel regimen   wound care as per ortho  GI PX  antiemetics prn  incentive spirometer

## 2021-01-06 NOTE — PHYSICAL THERAPY INITIAL EVALUATION ADULT - CRITERIA FOR SKILLED THERAPEUTIC INTERVENTIONS
impairments found/functional limitations in following categories/risk reduction/prevention/anticipated equipment needs at discharge/anticipated discharge recommendation

## 2021-01-07 ENCOUNTER — TRANSCRIPTION ENCOUNTER (OUTPATIENT)
Age: 71
End: 2021-01-07

## 2021-01-07 VITALS
TEMPERATURE: 98 F | SYSTOLIC BLOOD PRESSURE: 126 MMHG | HEART RATE: 88 BPM | RESPIRATION RATE: 17 BRPM | OXYGEN SATURATION: 98 % | DIASTOLIC BLOOD PRESSURE: 84 MMHG

## 2021-01-07 LAB
ANION GAP SERPL CALC-SCNC: 8 MMOL/L — SIGNIFICANT CHANGE UP (ref 5–17)
BUN SERPL-MCNC: 12 MG/DL — SIGNIFICANT CHANGE UP (ref 7–23)
CALCIUM SERPL-MCNC: 9 MG/DL — SIGNIFICANT CHANGE UP (ref 8.5–10.1)
CHLORIDE SERPL-SCNC: 106 MMOL/L — SIGNIFICANT CHANGE UP (ref 96–108)
CO2 SERPL-SCNC: 26 MMOL/L — SIGNIFICANT CHANGE UP (ref 22–31)
CREAT SERPL-MCNC: 0.98 MG/DL — SIGNIFICANT CHANGE UP (ref 0.5–1.3)
GLUCOSE SERPL-MCNC: 120 MG/DL — HIGH (ref 70–99)
HCT VFR BLD CALC: 38.6 % — SIGNIFICANT CHANGE UP (ref 34.5–45)
HGB BLD-MCNC: 12.2 G/DL — SIGNIFICANT CHANGE UP (ref 11.5–15.5)
MCHC RBC-ENTMCNC: 27.1 PG — SIGNIFICANT CHANGE UP (ref 27–34)
MCHC RBC-ENTMCNC: 31.6 GM/DL — LOW (ref 32–36)
MCV RBC AUTO: 85.8 FL — SIGNIFICANT CHANGE UP (ref 80–100)
NRBC # BLD: 0 /100 WBCS — SIGNIFICANT CHANGE UP (ref 0–0)
PLATELET # BLD AUTO: 196 K/UL — SIGNIFICANT CHANGE UP (ref 150–400)
POTASSIUM SERPL-MCNC: 4.1 MMOL/L — SIGNIFICANT CHANGE UP (ref 3.5–5.3)
POTASSIUM SERPL-SCNC: 4.1 MMOL/L — SIGNIFICANT CHANGE UP (ref 3.5–5.3)
RBC # BLD: 4.5 M/UL — SIGNIFICANT CHANGE UP (ref 3.8–5.2)
RBC # FLD: 15.9 % — HIGH (ref 10.3–14.5)
SODIUM SERPL-SCNC: 140 MMOL/L — SIGNIFICANT CHANGE UP (ref 135–145)
WBC # BLD: 14.06 K/UL — HIGH (ref 3.8–10.5)
WBC # FLD AUTO: 14.06 K/UL — HIGH (ref 3.8–10.5)

## 2021-01-07 RX ORDER — ASCORBIC ACID 60 MG
1 TABLET,CHEWABLE ORAL
Qty: 0 | Refills: 0 | DISCHARGE
Start: 2021-01-07

## 2021-01-07 RX ORDER — CELECOXIB 200 MG/1
1 CAPSULE ORAL
Qty: 60 | Refills: 0
Start: 2021-01-07 | End: 2021-02-05

## 2021-01-07 RX ORDER — OXYCODONE HYDROCHLORIDE 5 MG/1
1 TABLET ORAL
Qty: 42 | Refills: 0
Start: 2021-01-07 | End: 2021-01-13

## 2021-01-07 RX ORDER — ASPIRIN/CALCIUM CARB/MAGNESIUM 324 MG
1 TABLET ORAL
Qty: 60 | Refills: 0
Start: 2021-01-07 | End: 2021-02-05

## 2021-01-07 RX ORDER — SENNA PLUS 8.6 MG/1
2 TABLET ORAL
Qty: 0 | Refills: 0 | DISCHARGE
Start: 2021-01-07

## 2021-01-07 RX ADMIN — SODIUM CHLORIDE 150 MILLILITER(S): 9 INJECTION, SOLUTION INTRAVENOUS at 05:35

## 2021-01-07 RX ADMIN — OXYCODONE HYDROCHLORIDE 10 MILLIGRAM(S): 5 TABLET ORAL at 01:40

## 2021-01-07 RX ADMIN — PANTOPRAZOLE SODIUM 40 MILLIGRAM(S): 20 TABLET, DELAYED RELEASE ORAL at 06:14

## 2021-01-07 RX ADMIN — LISINOPRIL 40 MILLIGRAM(S): 2.5 TABLET ORAL at 06:13

## 2021-01-07 RX ADMIN — SODIUM CHLORIDE 3 MILLILITER(S): 9 INJECTION INTRAMUSCULAR; INTRAVENOUS; SUBCUTANEOUS at 06:14

## 2021-01-07 RX ADMIN — AMLODIPINE BESYLATE 5 MILLIGRAM(S): 2.5 TABLET ORAL at 06:14

## 2021-01-07 RX ADMIN — Medication 500 MILLIGRAM(S): at 06:14

## 2021-01-07 RX ADMIN — Medication 500 MILLIGRAM(S): at 17:33

## 2021-01-07 RX ADMIN — OXYCODONE HYDROCHLORIDE 10 MILLIGRAM(S): 5 TABLET ORAL at 09:35

## 2021-01-07 RX ADMIN — Medication 81 MILLIGRAM(S): at 06:14

## 2021-01-07 RX ADMIN — Medication 1 TABLET(S): at 11:12

## 2021-01-07 RX ADMIN — Medication 100 MILLIGRAM(S): at 02:09

## 2021-01-07 RX ADMIN — CELECOXIB 200 MILLIGRAM(S): 200 CAPSULE ORAL at 17:33

## 2021-01-07 RX ADMIN — SODIUM CHLORIDE 3 MILLILITER(S): 9 INJECTION INTRAMUSCULAR; INTRAVENOUS; SUBCUTANEOUS at 14:31

## 2021-01-07 RX ADMIN — Medication 102 MILLIGRAM(S): at 05:35

## 2021-01-07 RX ADMIN — CELECOXIB 200 MILLIGRAM(S): 200 CAPSULE ORAL at 06:14

## 2021-01-07 RX ADMIN — POLYETHYLENE GLYCOL 3350 17 GRAM(S): 17 POWDER, FOR SOLUTION ORAL at 11:13

## 2021-01-07 RX ADMIN — Medication 81 MILLIGRAM(S): at 17:33

## 2021-01-07 NOTE — PROGRESS NOTE ADULT - SUBJECTIVE AND OBJECTIVE BOX
YUSEF RIGGS is a 70y Female s/p RIGHT TOTAL KNEE REPLACEMENT        denies any chest pain shortness of breath palpitation dizziness lightheadedness nausea vomiting fever or chills    T(C): 36.3 (01-07-21 @ 05:58), Max: 36.6 (01-06-21 @ 18:40)  HR: 88 (01-07-21 @ 05:58) (81 - 107)  BP: 117/77 (01-07-21 @ 05:58) (109/69 - 146/91)  RR: 17 (01-07-21 @ 05:58) (13 - 20)  SpO2: 98% (01-07-21 @ 05:58) (94% - 100%)  no jvd/bruit  s1 s2 rrr  cta  s/nt/nd  no calf tend                        12.2   14.06 )-----------( 196      ( 07 Jan 2021 07:33 )             38.6   01-07    140  |  106  |  12  ----------------------------<  120<H>  4.1   |  26  |  0.98    Ca    9.0      07 Jan 2021 07:33        cont dvt px  pain control  bowel regimen  antiemetics  incentive spirometer
POD#1 S/P Right TKA  70yFemale Patient seen and examined, Pain controlled  Patient Denies SOB, CP, N/V/D       PE: Right Knee/LE: Dressing C/D/I, Sensation/motor intact, DP 2+, FROM ankle/toes   B/L LE: Skin intact. +ROM hip/knee/ankle/toes. Ankle Dorsi/plantarflexion: 5/5. Calf: soft, compressible and nontender. DP/PT 2+ NVI.                           12.2   14.06 )-----------( 196      ( 07 Jan 2021 07:33 )             38.6       01-07    140  |  106  |  12  ----------------------------<  120<H>  4.1   |  26  |  0.98    Ca    9.0      07 Jan 2021 07:33          A: As above   P: Pain Control       DVT Prophylaxis      Incentive spirometry      PT WBAT RLE      Isometric exercises      Discharge Planning      All the above discussed and understood by pt       Ortho to F/U 
Post op check  Patient is 70y y/o Female s/p R TKA POD#0  Patient is seen and examined at bedside.   Pt tolerated procedure well without any intra-op complications.    Pain is controlled.  Denies CP/SOB/Dizziness/N/V/D/HA.     Vital Signs Last 24 Hrs  T(C): 36.4 (06 Jan 2021 15:02), Max: 37 (06 Jan 2021 07:00)  T(F): 97.5 (06 Jan 2021 15:02), Max: 98.6 (06 Jan 2021 07:00)  HR: 94 (06 Jan 2021 15:02) (81 - 99)  BP: 125/82 (06 Jan 2021 15:02) (109/69 - 133/87)  BP(mean): --  RR: 17 (06 Jan 2021 15:02) (13 - 17)  SpO2: 98% (06 Jan 2021 15:02) (94% - 100%)      PHYSICAL EXAM:  General: A&Ox3 NAD  RLE: Richar Dressing C/D/I with ACE wrap in place. Motor intact + EHL/FHL/TA/GS.  Sensation is grossly intact.  Extremity warm, compartments soft, compressible. No calf tenderness. DP 2+   LLE: Motor intact +EHL/FHL/TA/GS. Sensation is grossly intact. Extremity warm, compartments soft, compressible. No calf tenderness. DP2+    Labs:                          12.0   9.15  )-----------( 194      ( 06 Jan 2021 12:53 )             38.0               A/P: Patient is a 70y y/o Female s/p R TKA, POD # 0  -wound care, knee extension/leg elevation, cryocuff, isometric exercises, new medications reviewed with pt  -Pain control/analgesia  -Inc spirometry reviewed with pt, demonstrated competence  -DVT prophylaxis with Venodynes/Aspirin 81 BID  -F/U AM Labs  -PT/OT/WBAT  -complete prophylactic Antibiotic  -medical consult pending  -DC planning

## 2021-01-07 NOTE — DISCHARGE NOTE NURSING/CASE MANAGEMENT/SOCIAL WORK - NSDCFUADDAPPT_GEN_ALL_CORE_FT
Follow up with your surgeon in two weeks. Call for appointment.  If you need more pain medication, call your surgeon's office. For medication refills or authorizations, please call [phone]  We recommend that you call and schedule a follow up appointment within 2-4 weeks with your primary care physician for repeat blood work (CBC and BMP) for post hospital discharge follow-up care.  Call your surgeon if you have increased redness/pain/drainage or fever. Return to ER for shortness of breath/calf tenderness.

## 2021-01-07 NOTE — DISCHARGE NOTE NURSING/CASE MANAGEMENT/SOCIAL WORK - PATIENT PORTAL LINK FT
You can access the FollowMyHealth Patient Portal offered by [facility] by registering at the following website: [email or web address]. By joining [facility]’s FollowMyHealth portal, you will also be able to view your health information using other applications (apps) compatible with our system.

## 2021-01-08 LAB — SURGICAL PATHOLOGY STUDY: SIGNIFICANT CHANGE UP

## 2021-01-11 DIAGNOSIS — M17.11 UNILATERAL PRIMARY OSTEOARTHRITIS, RIGHT KNEE: ICD-10-CM

## 2021-01-11 DIAGNOSIS — G89.29 OTHER CHRONIC PAIN: ICD-10-CM

## 2021-01-11 DIAGNOSIS — E73.9 LACTOSE INTOLERANCE, UNSPECIFIED: ICD-10-CM

## 2021-01-11 DIAGNOSIS — I10 ESSENTIAL (PRIMARY) HYPERTENSION: ICD-10-CM

## 2022-04-21 ENCOUNTER — APPOINTMENT (OUTPATIENT)
Dept: ORTHOPEDIC SURGERY | Facility: CLINIC | Age: 72
End: 2022-04-21
Payer: OTHER MISCELLANEOUS

## 2022-04-21 VITALS — WEIGHT: 170 LBS | HEIGHT: 63 IN | BODY MASS INDEX: 30.12 KG/M2

## 2022-04-21 DIAGNOSIS — E78.00 PURE HYPERCHOLESTEROLEMIA, UNSPECIFIED: ICD-10-CM

## 2022-04-21 DIAGNOSIS — I10 ESSENTIAL (PRIMARY) HYPERTENSION: ICD-10-CM

## 2022-04-21 PROCEDURE — 99072 ADDL SUPL MATRL&STAF TM PHE: CPT

## 2022-04-21 PROCEDURE — 99214 OFFICE O/P EST MOD 30 MIN: CPT

## 2022-04-21 RX ORDER — DICLOFENAC SODIUM 20 MG/G
2 SOLUTION TOPICAL
Qty: 1 | Refills: 3 | Status: ACTIVE | COMMUNITY
Start: 2022-04-21 | End: 1900-01-01

## 2022-04-21 NOTE — ASSESSMENT
[FreeTextEntry1] : S/P RIGHT TKA 1/6/21. SHE IS DOING WELL. STILL SIG WEAKNESS QUAD MUSCLE. LEFT KNEE PAIN CONTINUES AS WELL. CONTINUED RIGHT KNEE PHYSICAL THERAPY IS MEDICALLY NECESSARY. \par LEFT KNEE PAIN IS PROGRESSING AND SHE IS EXPERIENCING WEAKNESS AS WELL. CONTINUE TO REQUEST TREATMENT LEFT KNEE AS A CONSEQUENTIAL TO ALTERED GAIT FROM RIGHT KNEE INJURY. [employer] IS HELPING SLIGHTLY. EVENTUAL INJECTIONS AND SURGERY DISCUSSED WITH PT.

## 2022-04-21 NOTE — HISTORY OF PRESENT ILLNESS
[Right Leg] : right leg [Work related] : work related [Intermittent] : intermittent [Work] : work [Rest] : rest [Physical therapy] : physical therapy [Walking] : walking [Stairs] : stairs [] : yes [de-identified] : WC 4/8/19\par \par S/P RIGHT TKA 1/6/21. SHE IS DOING SLIGHTLY BETTER. PT CONTINUES TO BE DELAYED DUE TO WC ISSUES. WALKING\par WITH A CANE. LEFT KNEE PAIN CONTINUES TO PROGRESS. \par OOW [FreeTextEntry1] : knee [FreeTextEntry3] : 04/08/2019 [FreeTextEntry5] : work injuy [de-identified] : nothing

## 2022-04-21 NOTE — PHYSICAL EXAM
[Right] : right knee [4___] : hamstring 4[unfilled]/5 [Left] : left knee [5___] : hamstring 5[unfilled]/5 [] : no tenderness [TWNoteComboBox7] : flexion 115 degrees [de-identified] : extension 0 degrees

## 2022-04-21 NOTE — WORK
[Total] : total [Does not reveal pre-existing condition(s) that may affect treatment/prognosis] : does not reveal pre-existing condition(s) that may affect treatment/prognosis [Cane] : cane [Cannot return to work because ________] : cannot return to work because [unfilled] [No Rx restrictions] : No Rx restrictions. [FreeTextEntry1] : guarded

## 2022-06-09 ENCOUNTER — APPOINTMENT (OUTPATIENT)
Dept: ORTHOPEDIC SURGERY | Facility: CLINIC | Age: 72
End: 2022-06-09
Payer: OTHER MISCELLANEOUS

## 2022-06-09 DIAGNOSIS — M25.551 PAIN IN RIGHT HIP: ICD-10-CM

## 2022-06-09 PROCEDURE — 99214 OFFICE O/P EST MOD 30 MIN: CPT

## 2022-06-09 PROCEDURE — 99072 ADDL SUPL MATRL&STAF TM PHE: CPT

## 2022-06-09 NOTE — ASSESSMENT
[FreeTextEntry1] : S/P RIGHT TKA 1/6/21. SHE IS DOING WELL. STILL SIG WEAKNESS QUAD MUSCLE. LEFT KNEE PAIN CONTINUES AS WELL. CONTINUED RIGHT KNEE PHYSICAL THERAPY IS MEDICALLY NECESSARY. \par LEFT KNEE PAIN IS PROGRESSING AND SHE IS EXPERIENCING WEAKNESS AS WELL. CONTINUE TO REQUEST TREATMENT LEFT KNEE AS A CONSEQUENTIAL TO ALTERED GAIT FROM RIGHT KNEE INJURY. [employer] IS HELPING SLIGHTLY. EVENTUAL INJECTIONS AND SURGERY DISCUSSED WITH PT.\par NOW DEVELOPING PAIN I BACK AND RIGHT GROIN DUE TO ALTERED GAIT. REQUEST TO TREAT

## 2022-06-09 NOTE — HISTORY OF PRESENT ILLNESS
[Right Leg] : right leg [Work related] : work related [Intermittent] : intermittent [Work] : work [Rest] : rest [Physical therapy] : physical therapy [Walking] : walking [Stairs] : stairs [] : yes [de-identified] : WC 4/8/19\par \par S/P RIGHT TKA 1/6/21. SHE IS DOING SLIGHTLY BETTER. PT CONTINUES TO BE DELAYED DUE TO WC ISSUES. WALKING\par WITH A CANE. LEFT KNEE PAIN CONTINUES TO PROGRESS. NO TREATMENT SINCE LAST VISIT. NOW RIGHT GROIN AND BACK PAIN FROM ALTERED GAIT. PROGRESSING AS WELL. \par OOW [FreeTextEntry1] : knee [FreeTextEntry3] : 04/08/2019 [FreeTextEntry5] : work injuy [de-identified] : nothing

## 2022-07-28 ENCOUNTER — APPOINTMENT (OUTPATIENT)
Dept: ORTHOPEDIC SURGERY | Facility: CLINIC | Age: 72
End: 2022-07-28

## 2022-07-28 PROCEDURE — 99072 ADDL SUPL MATRL&STAF TM PHE: CPT

## 2022-07-28 PROCEDURE — 99215 OFFICE O/P EST HI 40 MIN: CPT

## 2022-07-28 RX ORDER — DICLOFENAC SODIUM 20 MG/G
2 SOLUTION TOPICAL
Qty: 1 | Refills: 3 | Status: ACTIVE | COMMUNITY
Start: 2022-06-09 | End: 1900-01-01

## 2022-07-28 NOTE — HISTORY OF PRESENT ILLNESS
[Right Leg] : right leg [Work related] : work related [Intermittent] : intermittent [Work] : work [Rest] : rest [Physical therapy] : physical therapy [Walking] : walking [Stairs] : stairs [] : yes [de-identified] : WC 4/8/19\par \par S/P RIGHT TKA 1/6/21. SHE IS DOING SLIGHTLY BETTER. PT CONTINUES TO BE DELAYED DUE TO WC ISSUES. WALKING\par WITH A CANE. LEFT KNEE PAIN CONTINUES TO PROGRESS. NO TREATMENT SINCE LAST VISIT. NOW RIGHT GROIN AND BACK PAIN FROM ALTERED GAIT. PROGRESSING AS WELL. \par OOW [FreeTextEntry1] : knee [FreeTextEntry3] : 04/08/2019 [FreeTextEntry5] : work injuy [de-identified] : nothing

## 2022-07-28 NOTE — PHYSICAL EXAM
[Flexion] : flexion [Extension] : extension [Bending to left] : bending to left [Bending to right] : bending to right [Right] : right knee [4___] : hamstring 4[unfilled]/5 [Left] : left knee [5___] : hamstring 5[unfilled]/5 [FreeTextEntry9] : PAIN WITH RESISTED STRAIGHT LEG RAISE RIGHT .  [] : no tenderness [TWNoteComboBox7] : flexion 115 degrees [de-identified] : extension 0 degrees

## 2022-09-08 ENCOUNTER — APPOINTMENT (OUTPATIENT)
Dept: ORTHOPEDIC SURGERY | Facility: CLINIC | Age: 72
End: 2022-09-08

## 2022-09-08 VITALS — HEIGHT: 63 IN | BODY MASS INDEX: 30.12 KG/M2 | WEIGHT: 170 LBS

## 2022-09-08 PROCEDURE — 99072 ADDL SUPL MATRL&STAF TM PHE: CPT

## 2022-09-08 PROCEDURE — 99215 OFFICE O/P EST HI 40 MIN: CPT

## 2022-09-08 NOTE — PHYSICAL EXAM
[Flexion] : flexion [Extension] : extension [Bending to left] : bending to left [Bending to right] : bending to right [Right] : right knee [4___] : hamstring 4[unfilled]/5 [Left] : left knee [5___] : hamstring 5[unfilled]/5 [FreeTextEntry9] : PAIN WITH RESISTED STRAIGHT LEG RAISE RIGHT .  [] : no tenderness [TWNoteComboBox7] : flexion 115 degrees [de-identified] : extension 0 degrees

## 2022-09-08 NOTE — HISTORY OF PRESENT ILLNESS
[Right Leg] : right leg [Work related] : work related [Intermittent] : intermittent [Work] : work [Rest] : rest [Physical therapy] : physical therapy [Walking] : walking [Stairs] : stairs [Not working due to injury] : Work status: not working due to injury [de-identified] : WC 4/8/19\par \par S/P RIGHT TKA 1/6/21. SHE IS DOING SLIGHTLY BETTER. PT CONTINUES TO BE DELAYED DUE TO WC ISSUES. WALKING\par WITH A CANE. LEFT KNEE PAIN CONTINUES TO PROGRESS. NO TREATMENT SINCE LAST VISIT. NOW RIGHT GROIN AND BACK PAIN FROM ALTERED GAIT. PROGRESSING AS WELL. \par OOW [] : no [FreeTextEntry1] : knee [FreeTextEntry3] : 04/08/2019 [FreeTextEntry5] : work injury [FreeTextEntry9] : Tylenol  [de-identified] : nothing

## 2022-09-08 NOTE — ASSESSMENT
[FreeTextEntry1] : WC 4/8/19:\par S/P RIGHT TKA 1/6/21. SHE IS DOING WELL. STILL SIG WEAKNESS QUAD MUSCLE. LEFT KNEE PAIN CONTINUES AS WELL. CONTINUED RIGHT KNEE PHYSICAL THERAPY IS MEDICALLY NECESSARY. \par LEFT KNEE PAIN IS PROGRESSING AND SHE IS EXPERIENCING WEAKNESS AS WELL. TREATMENT OF LEFT KNEE IS MEDICALLY NECESSARY AS A CONSEQUENTIAL TO ALTERED GAIT FROM RIGHT KNEE INJURY. [employer] IS HELPING SLIGHTLY. EVENTUAL INJECTIONS AND SURGERY DISCUSSED WITH PT.\par NOW DEVELOPING PAIN I BACK AND LLE RADICULAR PAIN DUE TO ALTERED GAIT. REQUEST TO TREAT\par LUMBAR MRI IS MEDICALLY NECESSARY TO EVAL FOR HNP, STENOSIS AND CONSEQUENTIALLY RELATED TO RT KNEE INJURY\par LT KNEE MRI IS MEDICALLY NECESSARY TO EVAL FOR OA, SCE AND CONSEQUENTIALLY RELATED TO RT KNEE INJURY. \par SHE WILL GET A DOPPLER OF THE RLE DUE TO POST OP SWELLING.

## 2022-10-06 ENCOUNTER — APPOINTMENT (OUTPATIENT)
Dept: ORTHOPEDIC SURGERY | Facility: CLINIC | Age: 72
End: 2022-10-06

## 2022-10-27 ENCOUNTER — APPOINTMENT (OUTPATIENT)
Dept: ORTHOPEDIC SURGERY | Facility: CLINIC | Age: 72
End: 2022-10-27

## 2022-10-27 ENCOUNTER — NON-APPOINTMENT (OUTPATIENT)
Age: 72
End: 2022-10-27

## 2022-10-27 PROCEDURE — 99072 ADDL SUPL MATRL&STAF TM PHE: CPT

## 2022-10-27 PROCEDURE — 99215 OFFICE O/P EST HI 40 MIN: CPT

## 2022-10-27 NOTE — PHYSICAL EXAM
[Flexion] : flexion [Extension] : extension [Bending to left] : bending to left [Bending to right] : bending to right [Right] : right knee [4___] : hamstring 4[unfilled]/5 [Left] : left knee [5___] : hamstring 5[unfilled]/5 [FreeTextEntry9] : PAIN WITH RESISTED STRAIGHT LEG RAISE RIGHT .  [] : no tenderness [TWNoteComboBox7] : flexion 115 degrees [de-identified] : extension 0 degrees

## 2022-10-27 NOTE — ASSESSMENT
[FreeTextEntry1] : WC 4/8/19:\par S/P RIGHT TKA 1/6/21. SHE IS DOING WELL. STILL SIG WEAKNESS QUAD MUSCLE. LEFT KNEE PAIN CONTINUES AS WELL. CONTINUED RIGHT KNEE PHYSICAL THERAPY IS MEDICALLY NECESSARY. \par LEFT KNEE PAIN IS PROGRESSING AND SHE IS EXPERIENCING WEAKNESS AS WELL. TREATMENT OF LEFT KNEE IS MEDICALLY NECESSARY AS A CONSEQUENTIAL TO ALTERED GAIT FROM RIGHT KNEE INJURY. [employer] IS HELPING SLIGHTLY. EVENTUAL INJECTIONS AND SURGERY DISCUSSED WITH PT.\par \par NOW DEVELOPING PAIN I BACK AND LLE RADICULAR PAIN DUE TO ALTERED GAIT. \par LUMBAR PT IS MEDICALLY NECESSARY. LUMBAR PAIN IS CONSEQUENTIALLY RELATED TO RT KNEE INJURY.\par LT KNEE PAIN IS CONSEQUENTIALLY RELATED TO RT KNEE INJURY. \par WILL REQUEST AUTH FOR LEFT KNEE EUFLEXXA. THIS INJECTION IS MEDICALLY NECESSARY DUE TO SEVERE PAIN AND OA.

## 2022-10-27 NOTE — HISTORY OF PRESENT ILLNESS
[Right Leg] : right leg [Work related] : work related [Intermittent] : intermittent [Work] : work [Rest] : rest [Physical therapy] : physical therapy [Walking] : walking [Stairs] : stairs [Not working due to injury] : Work status: not working due to injury [de-identified] : WC 4/8/19\par \par S/P RIGHT TKA 1/6/21. SHE IS DOING SLIGHTLY BETTER. PT CONTINUES TO BE DELAYED DUE TO WC ISSUES. WALKING\par WITH A CANE. LEFT KNEE PAIN CONTINUES TO PROGRESS. NO TREATMENT SINCE LAST VISIT. NOW RIGHT GROIN AND BACK PAIN FROM ALTERED GAIT. PROGRESSING AS WELL. \par OOW [] : no [FreeTextEntry1] : knee [FreeTextEntry3] : 04/08/2019 [FreeTextEntry5] : work injury [FreeTextEntry9] : Tylenol  [de-identified] : nothing

## 2022-12-15 ENCOUNTER — APPOINTMENT (OUTPATIENT)
Dept: ORTHOPEDIC SURGERY | Facility: CLINIC | Age: 72
End: 2022-12-15

## 2022-12-15 VITALS — WEIGHT: 170 LBS | HEIGHT: 63 IN | BODY MASS INDEX: 30.12 KG/M2

## 2022-12-15 PROCEDURE — 99072 ADDL SUPL MATRL&STAF TM PHE: CPT

## 2022-12-15 PROCEDURE — 99214 OFFICE O/P EST MOD 30 MIN: CPT | Mod: 25

## 2022-12-15 PROCEDURE — 20610 DRAIN/INJ JOINT/BURSA W/O US: CPT

## 2022-12-15 NOTE — PHYSICAL EXAM
[Flexion] : flexion [Extension] : extension [Bending to left] : bending to left [Bending to right] : bending to right [Right] : right knee [4___] : hamstring 4[unfilled]/5 [Left] : left knee [5___] : hamstring 5[unfilled]/5 [FreeTextEntry9] : PAIN WITH RESISTED STRAIGHT LEG RAISE RIGHT .  [] : no tenderness [TWNoteComboBox7] : flexion 115 degrees [de-identified] : extension 0 degrees

## 2022-12-15 NOTE — ASSESSMENT
[FreeTextEntry1] : WC 4/8/19:\par S/P RIGHT TKA 1/6/21. SHE IS DOING WELL. STILL SIG WEAKNESS QUAD MUSCLE. LEFT KNEE PAIN CONTINUES AS WELL. CONTINUED RIGHT KNEE PHYSICAL THERAPY IS MEDICALLY NECESSARY TO AID IN PAIN AND FUNCTION\par \par LEFT KNEE PAIN IS PROGRESSING AND SHE IS EXPERIENCING WEAKNESS AS WELL. TREATMENT OF LEFT KNEE IS MEDICALLY NECESSARY AS A CONSEQUENTIAL TO ALTERED GAIT FROM RIGHT KNEE INJURY. [employer] IS HELPING SLIGHTLY.  LT KNEE PAIN IS CONSEQUENTIALLY RELATED TO RT KNEE INJURY. \par \par NOW DEVELOPING PAIN I BACK AND LLE RADICULAR PAIN DUE TO ALTERED GAIT. MRI WITH MULTILEVEL DDD AND STENOSIS\par LUMBAR PT IS MEDICALLY NECESSARY. LUMBAR PAIN IS CONSEQUENTIALLY RELATED TO RT KNEE INJURY.\par RECOMMEND PAIN MANAGEMENT CONSULT TO CONSIDER LESI\par \par \par LEFT KNEE EUFLEXXA #1 TODAY. PADMINI WELL. FOLLOW UP IN 1 WEEK TO CONTINUE

## 2022-12-15 NOTE — PROCEDURE
[de-identified] : Procedure Name: Euflexxa (Large Joint)\par \par Viscosupplementation Injection: X-ray evidence of Osteoarthritis on this or prior visit, Patient has tried OTC's including aspirin, Ibuprofen, Aleve etc or prescription NSAIDS, and/or exercises at home and/ or physical therapy without satisfactory response and Repeat series performed because patient had significant improvement in their pain and functional capacity from prior series which was given more than six months ago. \par \par An injection of Euflexxa 2ml #1 was injected into the left knee(s). after verbal consent using sterile technique. The risks, benefits, and alternatives to Viscosupplementation injection were explained in full to the patient. Risks outlined include but are not limited to infection, sepsis, bleeding, scarring, skin discoloration, temporary increase in pain, syncopal episode, failure to resolve symptoms, allergic reaction, and symptom recurrence. Signs and symptoms of infection reviewed and patient advised to call immediately for redness, fevers, and/or chills. Patient understood the risks. All questions were answered. After discussion of options, patient requested Viscosupplementation. The patient tolerated the procedure well. Ice tonight to the injection site. \par

## 2022-12-15 NOTE — HISTORY OF PRESENT ILLNESS
[Right Leg] : right leg [Work related] : work related [9] : 9 [5] : 5 [Intermittent] : intermittent [Work] : work [Rest] : rest [Physical therapy] : physical therapy [Walking] : walking [Stairs] : stairs [Not working due to injury] : Work status: not working due to injury [de-identified] : WC 4/8/19 BILATERAL KNEES\par \par S/P RIGHT TKA 1/6/21. SHE IS DOING SLIGHTLY BETTER. PT CONTINUES TO BE DELAYED DUE TO WC ISSUES. WALKING\par WITH A CANE. LEFT KNEE PAIN CONTINUES TO PROGRESS. NO TREATMENT SINCE LAST VISIT. NOW RIGHT GROIN AND BACK PAIN FROM ALTERED GAIT. PROGRESSING AS WELL. LEFT KNEE PAIN CONTINUES AS WELL. AUTH FOR EUFLEXXA GREANTED\par OOW\par LEFT LEG WITH SHOCK LIKE PAIN DOWN FROM HIP TO ANKLE WORSE AT NIGHT AS WELL [] : Post Surgical Visit: no [FreeTextEntry1] : knee [FreeTextEntry3] : 04/08/2019 [FreeTextEntry5] : work injury [FreeTextEntry7] : ANKLE [FreeTextEntry9] : Tylenol  [de-identified] : nothing

## 2022-12-18 NOTE — ASU PREOP CHECKLIST - LATEX ALLERGY
[facility]  Neurosurgery  Progress Note    Subjective:     History of Present Illness: No notes on file    Post-Op Info:  Procedure(s) (LRB):  REPAIR, ETHMOID SINUS, ENDOSCOPIC, FOR CSF LEAK (Left)  FESS, USING COMPUTER-ASSISTED NAVIGATION (Bilateral)  LUMBAR PUNCTURE (N/A)   2 Days Post-Op     Interval History: 12/18: AF, 115-169 SBP. NAEON. LD day 2/3, 5cc/hr. Exam stable    Medications:  Continuous Infusions:  Scheduled Meds:   atorvastatin  80 mg Oral Daily    fluorescein  5 mL Intravenous Once    gabapentin  300 mg Oral TID    levothyroxine  50 mcg Oral Before breakfast    LIDOcaine (PF) 10 mg/ml (1%)  1 mL Intradermal Once    methocarbamoL  500 mg Oral QID    mupirocin   Nasal BID    polyethylene glycol  17 g Oral Daily    tamsulosin  0.4 mg Oral Daily     PRN Meds:acetaminophen, labetalol, oxyCODONE, QUEtiapine, sodium chloride 0.9%     Review of Systems  Objective:     Weight: 88.9 kg (196 lb)  Body mass index is 28.94 kg/m².  Vital Signs (Most Recent):  Temp: 98.9 °F (37.2 °C) (12/18/22 0424)  Pulse: 70 (12/18/22 0605)  Resp: (!) 33 (12/18/22 0718)  BP: 135/78 (12/18/22 0605)  SpO2: 95 % (12/18/22 0605)   Vital Signs (24h Range):  Temp:  [98.2 °F (36.8 °C)-98.9 °F (37.2 °C)] 98.9 °F (37.2 °C)  Pulse:  [63-91] 70  Resp:  [14-33] 33  SpO2:  [91 %-98 %] 95 %  BP: (115-169)/(65-90) 135/78                     Male External Urinary Catheter 12/16/22 2030 Medium (Active)   Collection Container Urimeter 12/18/22 0305   Securement Method secured to top of thigh w/ leg strap 12/18/22 0305   Skin no breakdown;no redness 12/18/22 0305   Tolerance no signs/symptoms of discomfort 12/18/22 0305   Output (mL) 475 mL 12/18/22 0605   Catheter Change Date 12/17/22 12/18/22 0305   Catheter Change Time 2030 12/17/22 0305            Lumbar Drain 12/16/22 2000 (Active)   Drain Status Clamped 12/18/22 0305   Drain Level (cm) 10 cm 12/17/22 1905   Level to other (see comment) 12/18/22 0305   CSF Color  Other (Comment) 12/18/22 0305   Site Description Healing 12/18/22 0305   Dressing Status Intact 12/18/22 0305   Interventions HOB degrees (see comment);Bed controls locked 12/18/22 0305   Output (mL) 5 mL 12/18/22 0605       Physical Exam    Neurosurgery Physical Exam  [id number]  Aox3  Cni, PERRL  Fcx4 AG  SILT     No drift  LD site c/d/I with tegaderms securing  Significant Labs:  Recent Labs   Lab 12/16/22 2037 12/18/22  0200    90    139   K 4.6 4.0    106   CO2 24 21*   BUN 16 11   CREATININE 0.8 0.8   CALCIUM 8.4* 8.4*   MG 2.0 2.2     Recent Labs   Lab 12/16/22 2037 12/18/22 0200   WBC 10.32 8.56   HGB 15.0 15.9   HCT 45.1 47.2    179     Recent Labs   Lab 12/16/22 2037   INR 1.1   APTT 24.3     Microbiology Results (last 7 days)       ** No results found for the last 168 hours. **          All pertinent labs from the last 24 hours have been reviewed.    Significant Diagnostics:  I have reviewed and interpreted all pertinent imaging results/findings within the past 24 hours.  CT Head Without Contrast    Result Date: 12/17/2022  No acute intracranial process. Osseous defects left greater than right cribriform plates and left lateral lamella. Electronically signed by: Pilo Mock Date:    12/17/2022 Time:    08:47       Assessment/Plan:     Status post functional endoscopic sinus surgery (FESS)  67M with LD placed on 12/16/22 for ethmoidal CSF fistual repair with ENT    Plan:  --icu   --q1 nc/vs icu, q2 stepdown, q4 floor  --all labs and significant diagnostics reviewed  --LD @ 5cc/hr x3 days (Day 2/3)  --please notfiy nsgy of any acute neurological decline    Dispo: icu while LD in place; TTF to ENT after LD removed            Ashish Martell MD  Neurosurgery  [facility]   no

## 2022-12-22 ENCOUNTER — APPOINTMENT (OUTPATIENT)
Dept: ORTHOPEDIC SURGERY | Facility: CLINIC | Age: 72
End: 2022-12-22

## 2022-12-22 VITALS — HEIGHT: 63 IN | WEIGHT: 170 LBS | BODY MASS INDEX: 30.12 KG/M2

## 2022-12-22 PROCEDURE — 99072 ADDL SUPL MATRL&STAF TM PHE: CPT | Mod: NC

## 2022-12-22 PROCEDURE — 20610 DRAIN/INJ JOINT/BURSA W/O US: CPT | Mod: LT

## 2022-12-22 PROCEDURE — 99213 OFFICE O/P EST LOW 20 MIN: CPT | Mod: 25

## 2022-12-22 NOTE — HISTORY OF PRESENT ILLNESS
[Right Leg] : right leg [Work related] : work related [9] : 9 [5] : 5 [Intermittent] : intermittent [Work] : work [Rest] : rest [Physical therapy] : physical therapy [Walking] : walking [Stairs] : stairs [Not working due to injury] : Work status: not working due to injury [Sleep] : sleep [de-identified] : WC 4/8/19 BILATERAL KNEES\par \par S/P RIGHT TKA 1/6/21. SHE IS DOING SLIGHTLY BETTER. PT CONTINUES TO BE DELAYED DUE TO WC ISSUES. WALKING\par WITH A CANE. LEFT KNEE PAIN CONTINUES TO PROGRESS. NO TREATMENT SINCE LAST VISIT. NOW RIGHT GROIN AND BACK PAIN FROM ALTERED GAIT. PROGRESSING AS WELL. LEFT KNEE PAIN CONTINUES AS WELL. AUTH FOR EUFLEXXA GREANTED\par OOW\par LEFT LEG WITH SHOCK LIKE PAIN DOWN FROM HIP TO ANKLE WORSE AT NIGHT AS WELL\par \par 12/22/22 left knee euflexxa # 2  [] : Post Surgical Visit: no [FreeTextEntry1] : knee [FreeTextEntry3] : 04/08/2019 [FreeTextEntry5] : work injury. Pt states her left knee was in a lot of pain last night and was unable to sleep. [FreeTextEntry7] : ANKLE [FreeTextEntry9] : Tylenol  [de-identified] : left knee euflexxa injections

## 2022-12-22 NOTE — ASSESSMENT
[FreeTextEntry1] : WC 4/8/19:\par S/P RIGHT TKA 1/6/21. SHE IS DOING WELL. STILL SIG WEAKNESS QUAD MUSCLE. LEFT KNEE PAIN CONTINUES AS WELL. CONTINUED RIGHT KNEE PHYSICAL THERAPY IS MEDICALLY NECESSARY TO AID IN PAIN AND FUNCTION\par \par LEFT KNEE PAIN IS PROGRESSING AND SHE IS EXPERIENCING WEAKNESS AS WELL. TREATMENT OF LEFT KNEE IS MEDICALLY NECESSARY AS A CONSEQUENTIAL TO ALTERED GAIT FROM RIGHT KNEE INJURY. [employer] IS HELPING SLIGHTLY.  LT KNEE PAIN IS CONSEQUENTIALLY RELATED TO RT KNEE INJURY. \par \par NOW DEVELOPING PAIN I BACK AND LLE RADICULAR PAIN DUE TO ALTERED GAIT. MRI WITH MULTILEVEL DDD AND STENOSIS\par LUMBAR PT IS MEDICALLY NECESSARY. LUMBAR PAIN IS CONSEQUENTIALLY RELATED TO RT KNEE INJURY.\par RECOMMEND PAIN MANAGEMENT CONSULT TO CONSIDER LESI\par \par LEFT KNEE EUFLEXXA #2 TODAY. PATIENT TOLERATED WELL. FOLLOW UP IN 1 WEEK TO CONTINUE

## 2022-12-22 NOTE — PHYSICAL EXAM
[Flexion] : flexion [Extension] : extension [Bending to left] : bending to left [Bending to right] : bending to right [Right] : right knee [4___] : hamstring 4[unfilled]/5 [Left] : left knee [5___] : hamstring 5[unfilled]/5 [FreeTextEntry9] : PAIN WITH RESISTED STRAIGHT LEG RAISE RIGHT .  [] : no tenderness [TWNoteComboBox7] : flexion 115 degrees [de-identified] : extension 0 degrees

## 2022-12-22 NOTE — PROCEDURE
"Subjective:      Gurvinder Meidna is a 74 y.o. male who returns today regarding his     Mr. Medina has a remote history of bladder cancer without reoccurrence. S/p BCG over 6 years ago. Most recent cytology is neg (8/18). Yearly cystoscopes have been unremarkable. CT at [facility] 2018 revealed bilateral simple renal cysts.      He denies dysuria, hematuria.      LUTS are well controlled with Avodart and Uroxatral.   Occasional urgency incont    Some right ing area discomfort  No bulge        The following portions of the patient's history were reviewed and updated as appropriate: allergies, current medications, past family history, past medical history, past social history, past surgical history and problem list.    Review of Systems  Pertinent items are noted in HPI.  A comprehensive multipoint review of systems was negative except as otherwise stated in the HPI.     Objective:   Vitals: BP (!) 141/72 (BP Location: Right arm, Patient Position: Sitting, BP Method: Large (Automatic))   Pulse 79   Ht 5' 8.5" (1.74 m)   Wt 83.9 kg (185 lb)   BMI 27.72 kg/m²     Physical Exam   General: alert and oriented, no acute distress  Respiratory: Symmetric expansion, non-labored breathing  Cardiovascular: normal to inspection  Abdomen: non distended   Skin: normal coloration and turgor, no rashes, no suspicious skin lesions noted  Neuro: no gross deficits  Psych: normal judgment and insight, normal mood/affect and non-anxious  JUAN JOSÉ no nodules  Prostate small  ?fullness in right inguinal area but no hernias palpable    Physical Exam    Lab Review   Urinalysis demonstrates negative for all components  No results found for: WBC, HGB, HCT, MCV, PLT  No results found for: CREATININE, BUN    Imaging  -  Assessment and Plan:   Lower urinary tract symptoms (LUTS)    Malignant neoplasm of urinary bladder, unspecified site  (Bladder Ca CIS VOLODYMYR sp BCG)    Avoid pm fluids  Avoid caffeine and alcohol  Timed voiding      Cont " uroxatral  Cont avodart      CT urogram to eval fullness in right inguinal area  Cytology  BMP  Cysto and PVR         [de-identified] : Procedure Name: Euflexxa (Large Joint)\par \par Viscosupplementation Injection: X-ray evidence of Osteoarthritis on this or prior visit, Patient has tried OTC's including aspirin, Ibuprofen, Aleve etc or prescription NSAIDS, and/or exercises at home and/ or physical therapy without satisfactory response and Repeat series performed because patient had significant improvement in their pain and functional capacity from prior series which was given more than six months ago. \par \par An injection of Euflexxa 2ml #2 was injected into the left knee(s). after verbal consent using sterile technique. The risks, benefits, and alternatives to Viscosupplementation injection were explained in full to the patient. Risks outlined include but are not limited to infection, sepsis, bleeding, scarring, skin discoloration, temporary increase in pain, syncopal episode, failure to resolve symptoms, allergic reaction, and symptom recurrence. Signs and symptoms of infection reviewed and patient advised to call immediately for redness, fevers, and/or chills. Patient understood the risks. All questions were answered. After discussion of options, patient requested Viscosupplementation. The patient tolerated the procedure well. Ice tonight to the injection site. \par

## 2022-12-29 ENCOUNTER — APPOINTMENT (OUTPATIENT)
Dept: ORTHOPEDIC SURGERY | Facility: CLINIC | Age: 72
End: 2022-12-29

## 2022-12-29 DIAGNOSIS — M48.061 SPINAL STENOSIS, LUMBAR REGION WITHOUT NEUROGENIC CLAUDICATION: ICD-10-CM

## 2022-12-29 PROCEDURE — 99214 OFFICE O/P EST MOD 30 MIN: CPT | Mod: 25

## 2022-12-29 PROCEDURE — 99072 ADDL SUPL MATRL&STAF TM PHE: CPT

## 2022-12-29 PROCEDURE — 20610 DRAIN/INJ JOINT/BURSA W/O US: CPT

## 2022-12-29 NOTE — HISTORY OF PRESENT ILLNESS
[Right Leg] : right leg [Work related] : work related [9] : 9 [5] : 5 [Intermittent] : intermittent [Work] : work [Sleep] : sleep [Rest] : rest [Physical therapy] : physical therapy [Walking] : walking [Stairs] : stairs [Not working due to injury] : Work status: not working due to injury [de-identified] : WC 4/8/19 BILATERAL KNEES\par \par S/P RIGHT TKA 1/6/21. SHE IS DOING SLIGHTLY BETTER. PT CONTINUES TO BE DELAYED DUE TO WC ISSUES. WALKING\par WITH A CANE. LEFT KNEE PAIN CONTINUES TO PROGRESS. NO TREATMENT SINCE LAST VISIT. NOW RIGHT GROIN AND BACK PAIN FROM ALTERED GAIT. PROGRESSING AS WELL. LEFT KNEE PAIN CONTINUES AS WELL. AUTH FOR EUFLEXXA GREANTED\par OOW\par LEFT LEG WITH SHOCK LIKE PAIN DOWN FROM HIP TO ANKLE WORSE AT NIGHT AS WELL\par \par 12/22/22 left knee euflexxa # 2 \par \par 12/29/22 left knee euflexxa # 3  [] : Post Surgical Visit: no [FreeTextEntry1] : knee [FreeTextEntry3] : 04/08/2019 [FreeTextEntry5] : work injury. Pt states her left knee was in a lot of pain last night and was unable to sleep. [FreeTextEntry7] : ANKLE [FreeTextEntry9] : Tylenol  [de-identified] : left knee euflexxa injections

## 2022-12-29 NOTE — ASSESSMENT
[FreeTextEntry1] : WC 4/8/19:\par S/P RIGHT TKA 1/6/21. SHE IS DOING WELL. STILL SIG WEAKNESS QUAD MUSCLE. LEFT KNEE PAIN CONTINUES AS WELL. CONTINUED RIGHT KNEE PHYSICAL THERAPY IS MEDICALLY NECESSARY TO AID IN PAIN AND FUNCTION\par \par LEFT KNEE PAIN IS PROGRESSING AND SHE IS EXPERIENCING WEAKNESS AS WELL. TREATMENT OF LEFT KNEE IS MEDICALLY NECESSARY AS A CONSEQUENTIAL TO ALTERED GAIT FROM RIGHT KNEE INJURY. [employer] IS HELPING SLIGHTLY.  LT KNEE PAIN IS CONSEQUENTIALLY RELATED TO RT KNEE INJURY. \par \par NOW DEVELOPING PAIN I BACK AND LLE RADICULAR PAIN DUE TO ALTERED GAIT. MRI WITH MULTILEVEL DDD AND STENOSIS\par LUMBAR PT IS MEDICALLY NECESSARY. LUMBAR PAIN IS CONSEQUENTIALLY RELATED TO RT KNEE INJURY.\par RECOMMEND PAIN MANAGEMENT CONSULT TO CONSIDER LESI\par \par LEFT KNEE EUFLEXXA #3 TODAY. PATIENT TOLERATED WELL. FOLLOW UP IN 1 WEEK TO CONTINUE

## 2022-12-29 NOTE — PHYSICAL EXAM
[Flexion] : flexion [Extension] : extension [Bending to left] : bending to left [Bending to right] : bending to right [Right] : right knee [4___] : hamstring 4[unfilled]/5 [Left] : left knee [5___] : hamstring 5[unfilled]/5 [FreeTextEntry9] : PAIN WITH RESISTED STRAIGHT LEG RAISE RIGHT .  [] : no tenderness [TWNoteComboBox7] : flexion 115 degrees [de-identified] : extension 0 degrees

## 2022-12-29 NOTE — PROCEDURE
[de-identified] : Procedure Name: Euflexxa (Large Joint)\par \par Viscosupplementation Injection: X-ray evidence of Osteoarthritis on this or prior visit, Patient has tried OTC's including aspirin, Ibuprofen, Aleve etc or prescription NSAIDS, and/or exercises at home and/ or physical therapy without satisfactory response and Repeat series performed because patient had significant improvement in their pain and functional capacity from prior series which was given more than six months ago. \par \par An injection of Euflexxa 2ml #3 was injected into the left knee(s). after verbal consent using sterile technique. The risks, benefits, and alternatives to Viscosupplementation injection were explained in full to the patient. Risks outlined include but are not limited to infection, sepsis, bleeding, scarring, skin discoloration, temporary increase in pain, syncopal episode, failure to resolve symptoms, allergic reaction, and symptom recurrence. Signs and symptoms of infection reviewed and patient advised to call immediately for redness, fevers, and/or chills. Patient understood the risks. All questions were answered. After discussion of options, patient requested Viscosupplementation. The patient tolerated the procedure well. Ice tonight to the injection site. \par

## 2022-12-30 ENCOUNTER — APPOINTMENT (OUTPATIENT)
Dept: PAIN MANAGEMENT | Facility: CLINIC | Age: 72
End: 2022-12-30
Payer: OTHER MISCELLANEOUS

## 2022-12-30 VITALS — HEIGHT: 63 IN | WEIGHT: 173 LBS | BODY MASS INDEX: 30.65 KG/M2

## 2022-12-30 PROCEDURE — 99072 ADDL SUPL MATRL&STAF TM PHE: CPT

## 2022-12-30 PROCEDURE — 99214 OFFICE O/P EST MOD 30 MIN: CPT

## 2022-12-30 PROCEDURE — 99244 OFF/OP CNSLTJ NEW/EST MOD 40: CPT

## 2022-12-30 NOTE — PHYSICAL EXAM
[de-identified] : Constitutional; Appears well, no apparent distress\par Ability to communicate: Normal \par Respiratory: non-labored breathing\par Skin: No rash noted\par Head: Normocephalic, atraumatic\par Neck: no visible thyroid enlargement\par Eyes: Extraocular movements intact\par Neurologic: Alert and oriented x3\par Psychiatric: normal mood, affect and behavior \par \par  [] : ambulation with cane

## 2022-12-30 NOTE — HISTORY OF PRESENT ILLNESS
[Lower back] : lower back [Work related] : work related [Dull/Aching] : dull/aching [Sharp] : sharp [Shooting] : shooting [Frequent] : frequent [Household chores] : household chores [Leisure] : leisure [Sleep] : sleep [Social interactions] : social interactions [Meds] : meds [Ice] : ice [Sitting] : sitting [Standing] : standing [Walking] : walking [Bending forward] : bending forward [Exercising] : exercising [Stairs] : stairs [Lying in bed] : lying in bed [] : yes [FreeTextEntry1] : Initial HPI 12/30/2022:\par WC 4/8/19 Hit foot into wheelchair \par Pain is on the LEFT side of the lower back and radiates down the left lateral thigh and lower leg to the ankle described as a sharp shooting pain. Patient is not interested in any injections at this time.\par \par MRI Lumbar Spine 10/13/22 independently reviewed: multilevel DDD - worst at L4-5 \par Conservative Care: Has Rx for PT for the knee but not he spine. \par Pain Medications: tylenol, pennsaid, cold packs \par Past Injections: none\par Spine surgery: none \par Blood thinners: none\par  [FreeTextEntry3] : 4/8/19

## 2022-12-30 NOTE — DISCUSSION/SUMMARY
[de-identified] : After discussing various treatment options with the patient including but not limited to oral medications, physical therapy, exercise modalities as well as interventional spinal injections, we have decided with the following plan:\par \par - Continue home exercises, stretching, activity modification, physical therapy, and conservative care.\par - MRI report and/or images was reviewed and discussed with the patient.\par - Follow-up as needed.\par - Will provide prescription for Physical Therapy.\par - Recommend Gabapentin 300mg QHS. Pt instructed not to drive or operate heavy machinery while on medications.\par - Patient is not interested in any injections at this time.\par

## 2023-02-09 ENCOUNTER — APPOINTMENT (OUTPATIENT)
Dept: ORTHOPEDIC SURGERY | Facility: CLINIC | Age: 73
End: 2023-02-09
Payer: OTHER MISCELLANEOUS

## 2023-02-09 ENCOUNTER — NON-APPOINTMENT (OUTPATIENT)
Age: 73
End: 2023-02-09

## 2023-02-09 VITALS — BODY MASS INDEX: 30.65 KG/M2 | WEIGHT: 173 LBS | HEIGHT: 63 IN

## 2023-02-09 PROCEDURE — 99072 ADDL SUPL MATRL&STAF TM PHE: CPT

## 2023-02-09 PROCEDURE — 99214 OFFICE O/P EST MOD 30 MIN: CPT

## 2023-02-09 RX ORDER — GABAPENTIN 300 MG/1
300 CAPSULE ORAL
Qty: 30 | Refills: 0 | Status: ACTIVE | COMMUNITY
Start: 2022-12-30 | End: 1900-01-01

## 2023-02-09 NOTE — PHYSICAL EXAM
[Flexion] : flexion [Extension] : extension [Bending to left] : bending to left [Bending to right] : bending to right [Right] : right knee [4___] : hamstring 4[unfilled]/5 [Left] : left knee [5___] : hamstring 5[unfilled]/5 [FreeTextEntry9] : PAIN WITH RESISTED STRAIGHT LEG RAISE RIGHT .  [] : no tenderness [TWNoteComboBox7] : flexion 115 degrees [de-identified] : extension 0 degrees

## 2023-02-09 NOTE — ASSESSMENT
[FreeTextEntry1] : WC 4/8/19:\par S/P RIGHT TKA 1/6/21. SHE IS DOING WELL. STILL SIG WEAKNESS QUAD MUSCLE. LEFT KNEE PAIN CONTINUES AS WELL. CONTINUED RIGHT KNEE PHYSICAL THERAPY IS MEDICALLY NECESSARY TO AID IN PAIN AND FUNCTION\par \par LEFT KNEE PAIN IS PROGRESSING AND SHE IS EXPERIENCING WEAKNESS AS WELL. TREATMENT OF LEFT KNEE IS MEDICALLY NECESSARY AS A CONSEQUENTIAL TO ALTERED GAIT FROM RIGHT KNEE INJURY. [employer] IS HELPING SLIGHTLY.  LT KNEE PAIN IS CONSEQUENTIALLY RELATED TO RT KNEE INJURY. \par \par NOW DEVELOPING PAIN I BACK AND LLE RADICULAR PAIN DUE TO ALTERED GAIT. MRI WITH MULTILEVEL DDD AND STENOSIS\par LUMBAR PT IS MEDICALLY NECESSARY. LUMBAR PAIN IS CONSEQUENTIALLY RELATED TO RT KNEE INJURY.\par RECOMMEND PAIN MANAGEMENT CONSULT TO CONSIDER LESI\par \par PT RX.

## 2023-02-09 NOTE — HISTORY OF PRESENT ILLNESS
[9] : 9 [5] : 5 [Right Leg] : right leg [Work related] : work related [Intermittent] : intermittent [Work] : work [Sleep] : sleep [Rest] : rest [Physical therapy] : physical therapy [Walking] : walking [Stairs] : stairs [Not working due to injury] : Work status: not working due to injury [de-identified] : WC 4/8/19 BILATERAL KNEES\par \par S/P RIGHT TKA 1/6/21. STILL STIFFNESS AND WEAKNESS TO BOTH LEGS. JUST STARTING PT DUE TO SCHEDULING. NO BACK TREATMENT YET. \par \par  [] : Post Surgical Visit: no [FreeTextEntry1] : knee [FreeTextEntry3] : 04/08/2019 [FreeTextEntry5] : work injury. Pt states her left knee was in a lot of pain last night and was unable to sleep. [FreeTextEntry7] : ANKLE [FreeTextEntry9] : Tylenol  [de-identified] : left knee euflexxa injections

## 2023-03-16 ENCOUNTER — APPOINTMENT (OUTPATIENT)
Dept: ORTHOPEDIC SURGERY | Facility: CLINIC | Age: 73
End: 2023-03-16
Payer: OTHER MISCELLANEOUS

## 2023-03-16 ENCOUNTER — NON-APPOINTMENT (OUTPATIENT)
Age: 73
End: 2023-03-16

## 2023-03-16 PROCEDURE — 99072 ADDL SUPL MATRL&STAF TM PHE: CPT

## 2023-03-16 PROCEDURE — 99214 OFFICE O/P EST MOD 30 MIN: CPT

## 2023-03-16 NOTE — PHYSICAL EXAM
[Flexion] : flexion [Extension] : extension [Bending to left] : bending to left [Bending to right] : bending to right [Right] : right knee [4___] : hamstring 4[unfilled]/5 [Left] : left knee [5___] : hamstring 5[unfilled]/5 [FreeTextEntry9] : PAIN WITH RESISTED STRAIGHT LEG RAISE RIGHT .  [] : no sign of infection [TWNoteComboBox7] : flexion 115 degrees [de-identified] : extension 0 degrees

## 2023-03-16 NOTE — WORK
[Does not reveal pre-existing condition(s) that may affect treatment/prognosis] : does not reveal pre-existing condition(s) that may affect treatment/prognosis [Cane] : cane [Cannot return to work because ________] : cannot return to work because [unfilled] [No Rx restrictions] : No Rx restrictions. [FreeTextEntry1] : guarded

## 2023-03-16 NOTE — HISTORY OF PRESENT ILLNESS
[Right Leg] : right leg [Work related] : work related [9] : 9 [5] : 5 [Intermittent] : intermittent [Work] : work [Sleep] : sleep [Rest] : rest [Physical therapy] : physical therapy [Walking] : walking [Stairs] : stairs [Not working due to injury] : Work status: not working due to injury [de-identified] : WC 4/8/19 BILATERAL KNEES\par \par S/P RIGHT TKA 1/6/21. STILL STIFFNESS AND WEAKNESS TO BOTH LEGS. PAIN IS PROGRESSING TO LEFT KNEE AND BACK PAIN IS PROGRESSING AS WELL. DOING HEP WITHOUT RELIEF. \par AWAITING AUTH\par \par  [] : Post Surgical Visit: no [FreeTextEntry1] : knee [FreeTextEntry3] : 04/08/2019 [FreeTextEntry5] : work injury. Pt states her left knee was in a lot of pain last night and was unable to sleep. [FreeTextEntry7] : ANKLE [FreeTextEntry9] : Tylenol  [de-identified] : left knee euflexxa injections

## 2023-03-16 NOTE — ASSESSMENT
[FreeTextEntry1] : WC 4/8/19:\par S/P RIGHT TKA 1/6/21. SHE IS DOING WELL. STILL SIG WEAKNESS QUAD MUSCLE. LEFT KNEE PAIN CONTINUES AS WELL. CONTINUED RIGHT KNEE PHYSICAL THERAPY IS MEDICALLY NECESSARY TO AID IN PAIN AND FUNCTION\par \par LEFT KNEE PAIN IS PROGRESSING AND SHE IS EXPERIENCING WEAKNESS AS WELL. TREATMENT OF LEFT KNEE IS MEDICALLY NECESSARY AS A CONSEQUENTIAL TO ALTERED GAIT FROM RIGHT KNEE INJURY. [employer] IS HELPING SLIGHTLY.  LT KNEE PAIN IS CONSEQUENTIALLY RELATED TO RT KNEE INJURY. \par WILL GIVE LEFT KNEE CORTISONE INJECTION TODAY. WILL REQUEST AUTH FOR LEFT KNEE EUFLEXXA IN JUNE\par \par NOW DEVELOPING PAIN I BACK AND LLE RADICULAR PAIN DUE TO ALTERED GAIT. MRI WITH MULTILEVEL DDD AND STENOSIS\par LUMBAR PT IS MEDICALLY NECESSARY. LUMBAR PAIN IS CONSEQUENTIALLY RELATED TO ALTERED GAIT FROM RT KNEE INJURY.\par RECOMMEND PAIN MANAGEMENT CONSULT TO CONSIDER LESI\par \par PT RX.

## 2023-03-22 ENCOUNTER — FORM ENCOUNTER (OUTPATIENT)
Age: 73
End: 2023-03-22

## 2023-04-20 ENCOUNTER — APPOINTMENT (OUTPATIENT)
Dept: ORTHOPEDIC SURGERY | Facility: CLINIC | Age: 73
End: 2023-04-20
Payer: OTHER MISCELLANEOUS

## 2023-04-20 ENCOUNTER — NON-APPOINTMENT (OUTPATIENT)
Age: 73
End: 2023-04-20

## 2023-04-20 VITALS — HEIGHT: 63 IN | WEIGHT: 173 LBS | BODY MASS INDEX: 30.65 KG/M2

## 2023-04-20 PROCEDURE — 99214 OFFICE O/P EST MOD 30 MIN: CPT

## 2023-04-20 NOTE — HISTORY OF PRESENT ILLNESS
[Right Leg] : right leg [Work related] : work related [6] : 6 [5] : 5 [Localized] : localized [Intermittent] : intermittent [Work] : work [Sleep] : sleep [Rest] : rest [Physical therapy] : physical therapy [Walking] : walking [Stairs] : stairs [Not working due to injury] : Work status: not working due to injury [de-identified] : WC 4/8/19 BILATERAL KNEES\par \par S/P RIGHT TKA 1/6/21. STILL STIFFNESS AND WEAKNESS TO BOTH LEGS. PAIN IS PROGRESSING TO LEFT KNEE AND BACK PAIN IS PROGRESSING AS WELL. DOING HEP WITHOUT RELIEF. \par AWAITING AUTH\par \par 04/20/23 FOLLOW UP WORKERS COMP RIGHT KNEE SOME STIFFNESS ,OUT OF WORK \par HAVING LEFT KNEE PAIN STILL AND BACK PAIN TO BOTH LEGS [] : Post Surgical Visit: no [FreeTextEntry1] : knee [FreeTextEntry3] : 04/08/2019 [FreeTextEntry5] : work injury. Pt states her left knee was in a lot of pain last night and was unable to sleep. [FreeTextEntry6] : STIFFNESS [FreeTextEntry7] : ANKLE [FreeTextEntry9] : Tylenol  [de-identified] : left knee euflexxa injections

## 2023-04-20 NOTE — WORK
[Does not reveal pre-existing condition(s) that may affect treatment/prognosis] : does not reveal pre-existing condition(s) that may affect treatment/prognosis [Cane] : cane [Cannot return to work because ________] : cannot return to work because [unfilled] [No Rx restrictions] : No Rx restrictions. [Total (100%)] : total (100%) [FreeTextEntry1] : guarded

## 2023-04-20 NOTE — PHYSICAL EXAM
[Flexion] : flexion [Extension] : extension [Bending to left] : bending to left [Bending to right] : bending to right [Right] : right knee [4___] : hamstring 4[unfilled]/5 [Left] : left knee [5___] : hamstring 5[unfilled]/5 [FreeTextEntry9] : PAIN WITH RESISTED STRAIGHT LEG RAISE RIGHT .  [] : no tenderness [TWNoteComboBox7] : flexion 115 degrees [de-identified] : extension 0 degrees

## 2023-05-12 ENCOUNTER — FORM ENCOUNTER (OUTPATIENT)
Age: 73
End: 2023-05-12

## 2023-06-01 ENCOUNTER — APPOINTMENT (OUTPATIENT)
Dept: ORTHOPEDIC SURGERY | Facility: CLINIC | Age: 73
End: 2023-06-01
Payer: OTHER MISCELLANEOUS

## 2023-06-01 ENCOUNTER — NON-APPOINTMENT (OUTPATIENT)
Age: 73
End: 2023-06-01

## 2023-06-01 VITALS — WEIGHT: 173 LBS | BODY MASS INDEX: 30.65 KG/M2 | HEIGHT: 63 IN

## 2023-06-01 PROCEDURE — 99214 OFFICE O/P EST MOD 30 MIN: CPT

## 2023-06-01 NOTE — HISTORY OF PRESENT ILLNESS
[Right Leg] : right leg [Work related] : work related [6] : 6 [5] : 5 [Localized] : localized [Intermittent] : intermittent [Work] : work [Sleep] : sleep [Rest] : rest [Physical therapy] : physical therapy [Walking] : walking [Stairs] : stairs [Not working due to injury] : Work status: not working due to injury [de-identified] : WC 4/8/19 BILATERAL KNEES\par \par S/P RIGHT TKA 1/6/21. STILL STIFFNESS AND WEAKNESS TO BOTH LEGS. PAIN IS PROGRESSING TO LEFT KNEE AND BACK PAIN IS PROGRESSING AS WELL. DOING HEP WITHOUT RELIEF. \par \par \par  FOLLOW UP WORKERS COMP RIGHT KNEE SOME STIFFNESS ,OUT OF WORK \par HAVING LEFT KNEE PAIN STILL AND BACK PAIN TO BOTH LEGS [] : Post Surgical Visit: no [FreeTextEntry1] : knee [FreeTextEntry3] : 04/08/2019 [FreeTextEntry5] : work injury. Pt states her left knee was in a lot of pain last night and was unable to sleep. [FreeTextEntry6] : STIFFNESS [FreeTextEntry7] : ANKLE [FreeTextEntry9] : Tylenol  [de-identified] : left knee euflexxa injections

## 2023-06-01 NOTE — WORK
[Total (100%)] : total (100%) [Does not reveal pre-existing condition(s) that may affect treatment/prognosis] : does not reveal pre-existing condition(s) that may affect treatment/prognosis [Cane] : cane [Cannot return to work because ________] : cannot return to work because [unfilled] [No Rx restrictions] : No Rx restrictions. [FreeTextEntry1] : guarded

## 2023-06-01 NOTE — ASSESSMENT
[FreeTextEntry1] : WC 4/8/19:\par S/P RIGHT TKA 1/6/21. SHE IS DOING WELL. STILL SIG WEAKNESS QUAD MUSCLE. LEFT KNEE PAIN CONTINUES AS WELL. CONTINUED RIGHT KNEE PHYSICAL THERAPY IS MEDICALLY NECESSARY TO AID IN PAIN AND FUNCTION\par \par LEFT KNEE PAIN IS PROGRESSING AND SHE IS EXPERIENCING WEAKNESS AS WELL. TREATMENT OF LEFT KNEE IS MEDICALLY NECESSARY AS A CONSEQUENTIAL TO ALTERED GAIT FROM RIGHT KNEE INJURY. [employer] IS HELPING SLIGHTLY.  LT KNEE PAIN IS CONSEQUENTIALLY RELATED TO RT KNEE INJURY. \par WILL GIVE LEFT KNEE CORTISONE INJECTION TODAY. WILL REQUEST AUTH FOR LEFT KNEE EUFLEXXA IN JUNE\par \par NOW DEVELOPING PAIN I BACK AND LLE RADICULAR PAIN DUE TO ALTERED GAIT. MRI WITH MULTILEVEL DDD AND STENOSIS\par LUMBAR PT IS MEDICALLY NECESSARY. LUMBAR PAIN IS CONSEQUENTIALLY RELATED TO ALTERED GAIT FROM RT KNEE INJURY.\par RECOMMEND PAIN MANAGEMENT CONSULT TO CONSIDER LESI\par \par PT RX.  RECENT AUTH

## 2023-06-01 NOTE — PHYSICAL EXAM
[Flexion] : flexion [Extension] : extension [Bending to left] : bending to left [Bending to right] : bending to right [Right] : right knee [4___] : hamstring 4[unfilled]/5 [Left] : left knee [5___] : hamstring 5[unfilled]/5 [FreeTextEntry9] : PAIN WITH RESISTED STRAIGHT LEG RAISE RIGHT .  [] : no tenderness [TWNoteComboBox7] : flexion 115 degrees [de-identified] : extension 0 degrees

## 2023-07-13 ENCOUNTER — APPOINTMENT (OUTPATIENT)
Dept: ORTHOPEDIC SURGERY | Facility: CLINIC | Age: 73
End: 2023-07-13
Payer: OTHER MISCELLANEOUS

## 2023-07-13 ENCOUNTER — NON-APPOINTMENT (OUTPATIENT)
Age: 73
End: 2023-07-13

## 2023-07-13 PROCEDURE — 20610 DRAIN/INJ JOINT/BURSA W/O US: CPT

## 2023-07-13 PROCEDURE — 99214 OFFICE O/P EST MOD 30 MIN: CPT | Mod: 25

## 2023-07-13 NOTE — HISTORY OF PRESENT ILLNESS
[Right Leg] : right leg [Work related] : work related [6] : 6 [5] : 5 [Localized] : localized [Intermittent] : intermittent [Work] : work [Sleep] : sleep [Rest] : rest [Physical therapy] : physical therapy [Walking] : walking [Stairs] : stairs [Not working due to injury] : Work status: not working due to injury [de-identified] : WC 4/8/19 BILATERAL KNEES\par \par S/P RIGHT TKA 1/6/21. STILL STIFFNESS AND WEAKNESS TO BOTH LEGS. PAIN IS PROGRESSING TO LEFT KNEE AND BACK PAIN IS PROGRESSING AS WELL. DOING HEP WITHOUT RELIEF. \par \par \par  FOLLOW UP WORKERS COMP RIGHT KNEE SOME STIFFNESS ,OUT OF WORK \par HAVING LEFT KNEE PAIN STILL AND BACK PAIN TO BOTH LEGS\par \par 07/13/23 HERE TO START LEFT KNEE EUFLEXXA # 1  [] : Post Surgical Visit: no [FreeTextEntry1] : knee [FreeTextEntry3] : 04/08/2019 [FreeTextEntry5] : work injury. Pt states her left knee was in a lot of pain last night and was unable to sleep. [FreeTextEntry6] : STIFFNESS [FreeTextEntry7] : ANKLE [FreeTextEntry9] : Tylenol  [de-identified] : left knee euflexxa injections

## 2023-07-13 NOTE — PHYSICAL EXAM
[Flexion] : flexion [Extension] : extension [Bending to left] : bending to left [Bending to right] : bending to right [Right] : right knee [4___] : hamstring 4[unfilled]/5 [Left] : left knee [5___] : hamstring 5[unfilled]/5 [FreeTextEntry9] : PAIN WITH RESISTED STRAIGHT LEG RAISE RIGHT .  [] : no tenderness [TWNoteComboBox7] : flexion 115 degrees [de-identified] : extension 0 degrees

## 2023-07-13 NOTE — ASSESSMENT
[FreeTextEntry1] : WC 4/8/19:\par S/P RIGHT TKA 1/6/21. SHE IS DOING WELL. STILL SIG WEAKNESS QUAD MUSCLE. LEFT KNEE PAIN CONTINUES AS WELL. CONTINUED RIGHT KNEE PHYSICAL THERAPY IS MEDICALLY NECESSARY TO AID IN PAIN AND FUNCTION\par \par LEFT KNEE PAIN IS PROGRESSING AND SHE IS EXPERIENCING WEAKNESS AS WELL. TREATMENT OF LEFT KNEE IS MEDICALLY NECESSARY AS A CONSEQUENTIAL TO ALTERED GAIT FROM RIGHT KNEE INJURY. [employer] IS HELPING SLIGHTLY.  LT KNEE PAIN IS CONSEQUENTIALLY RELATED TO RT KNEE INJURY. \par WILL GIVE LEFT KNEE CORTISONE INJECTION TODAY. \par \par NOW DEVELOPING PAIN I BACK AND LLE RADICULAR PAIN DUE TO ALTERED GAIT. MRI WITH MULTILEVEL DDD AND STENOSIS\par LUMBAR PT IS MEDICALLY NECESSARY. LUMBAR PAIN IS CONSEQUENTIALLY RELATED TO ALTERED GAIT FROM RT KNEE INJURY.\par RECOMMEND PAIN MANAGEMENT CONSULT TO CONSIDER LESI\par \par PT RX.

## 2023-07-20 ENCOUNTER — APPOINTMENT (OUTPATIENT)
Dept: ORTHOPEDIC SURGERY | Facility: CLINIC | Age: 73
End: 2023-07-20
Payer: OTHER MISCELLANEOUS

## 2023-07-20 VITALS — BODY MASS INDEX: 30.65 KG/M2 | WEIGHT: 173 LBS | HEIGHT: 63 IN

## 2023-07-20 PROCEDURE — 20610 DRAIN/INJ JOINT/BURSA W/O US: CPT | Mod: LT

## 2023-07-20 PROCEDURE — 99214 OFFICE O/P EST MOD 30 MIN: CPT | Mod: 25

## 2023-07-20 NOTE — PROCEDURE
[de-identified] : Procedure Name: Euflexxa (Large Joint)\par \par Viscosupplementation Injection: X-ray evidence of Osteoarthritis on this or prior visit, Patient has tried OTC's including aspirin, Ibuprofen, Aleve etc or prescription NSAIDS, and/or exercises at home and/ or physical therapy without satisfactory response and Repeat series performed because patient had significant improvement in their pain and functional capacity from prior series which was given more than six months ago. \par \par An injection of Euflexxa 2ml #2 was injected into the left knee(s). after verbal consent using sterile technique. The risks, benefits, and alternatives to Viscosupplementation injection were explained in full to the patient. Risks outlined include but are not limited to infection, sepsis, bleeding, scarring, skin discoloration, temporary increase in pain, syncopal episode, failure to resolve symptoms, allergic reaction, and symptom recurrence. Signs and symptoms of infection reviewed and patient advised to call immediately for redness, fevers, and/or chills. Patient understood the risks. All questions were answered. After discussion of options, patient requested Viscosupplementation. The patient tolerated the procedure well. Ice tonight to the injection site. \par

## 2023-07-20 NOTE — HISTORY OF PRESENT ILLNESS
[Right Leg] : right leg [Work related] : work related [6] : 6 [5] : 5 [Localized] : localized [Intermittent] : intermittent [Work] : work [Sleep] : sleep [Rest] : rest [Physical therapy] : physical therapy [Walking] : walking [Stairs] : stairs [Not working due to injury] : Work status: not working due to injury [de-identified] : WC 4/8/19 BILATERAL KNEES\par \par S/P RIGHT TKA 1/6/21. STILL STIFFNESS AND WEAKNESS TO BOTH LEGS. PAIN IS PROGRESSING TO LEFT KNEE AND BACK PAIN IS PROGRESSING AS WELL. DOING HEP WITHOUT RELIEF. \par \par \par  FOLLOW UP WORKERS COMP RIGHT KNEE SOME STIFFNESS ,OUT OF WORK \par HAVING LEFT KNEE PAIN STILL AND BACK PAIN TO BOTH LEGS\par \par 07/13/23 HERE TO START LEFT KNEE EUFLEXXA # 1 \par \par 07/20/23 LEFT KNEE EUFLEXXA # 2  [] : Post Surgical Visit: no [FreeTextEntry3] : 04/08/2019 [FreeTextEntry1] : knee [FreeTextEntry5] : work injury. Pt states her left knee was in a lot of pain last night and was unable to sleep. [FreeTextEntry7] : ANKLE [FreeTextEntry6] : STIFFNESS [FreeTextEntry9] : Tylenol  [de-identified] : left knee euflexxa injections

## 2023-07-20 NOTE — PHYSICAL EXAM
[Flexion] : flexion [Extension] : extension [Bending to left] : bending to left [Bending to right] : bending to right [Right] : right knee [4___] : hamstring 4[unfilled]/5 [Left] : left knee [5___] : hamstring 5[unfilled]/5 [FreeTextEntry9] : PAIN WITH RESISTED STRAIGHT LEG RAISE RIGHT .  [] : no tenderness [de-identified] : extension 0 degrees [TWNoteComboBox7] : flexion 115 degrees

## 2023-07-20 NOTE — ASSESSMENT
[FreeTextEntry1] : WC 4/8/19:\par S/P RIGHT TKA 1/6/21. SHE IS DOING WELL. STILL SIG WEAKNESS QUAD MUSCLE. LEFT KNEE PAIN CONTINUES AS WELL. CONTINUED RIGHT KNEE PHYSICAL THERAPY IS MEDICALLY NECESSARY TO AID IN PAIN AND FUNCTION\par \par LEFT KNEE PAIN IS PROGRESSING AND SHE IS EXPERIENCING WEAKNESS AS WELL. TREATMENT OF LEFT KNEE IS MEDICALLY NECESSARY AS A CONSEQUENTIAL TO ALTERED GAIT FROM RIGHT KNEE INJURY. [employer] IS HELPING SLIGHTLY.  LT KNEE PAIN IS CONSEQUENTIALLY RELATED TO RT KNEE INJURY. \par \par NOW DEVELOPING PAIN I BACK AND LLE RADICULAR PAIN DUE TO ALTERED GAIT. MRI WITH MULTILEVEL DDD AND STENOSIS\par LUMBAR PT IS MEDICALLY NECESSARY. LUMBAR PAIN IS CONSEQUENTIALLY RELATED TO ALTERED GAIT FROM RT KNEE INJURY.\par RECOMMEND PAIN MANAGEMENT CONSULT TO CONSIDER LESI\par \par LT KNEE EUFLEXXA #2 TODAY. PATIENT TOLERATED INJECTION WELL.  Split-Thickness Skin Graft Text: The defect edges were debeveled with a #15 scalpel blade.  Given the location of the defect, shape of the defect and the proximity to free margins a split thickness skin graft was deemed most appropriate.  Using a sterile surgical marker, the primary defect shape was transferred to the donor site. The split thickness graft was then harvested.  The skin graft was then placed in the primary defect and oriented appropriately.

## 2023-07-27 ENCOUNTER — APPOINTMENT (OUTPATIENT)
Dept: ORTHOPEDIC SURGERY | Facility: CLINIC | Age: 73
End: 2023-07-27
Payer: OTHER MISCELLANEOUS

## 2023-07-27 PROCEDURE — 99214 OFFICE O/P EST MOD 30 MIN: CPT | Mod: 25

## 2023-07-27 RX ORDER — DICLOFENAC SODIUM 20 MG/G
2 SOLUTION TOPICAL
Qty: 1 | Refills: 2 | Status: ACTIVE | COMMUNITY
Start: 2023-07-27 | End: 1900-01-01

## 2023-07-27 NOTE — PROCEDURE
[de-identified] : Procedure Name: Euflexxa (Large Joint)\par \par Viscosupplementation Injection: X-ray evidence of Osteoarthritis on this or prior visit, Patient has tried OTC's including aspirin, Ibuprofen, Aleve etc or prescription NSAIDS, and/or exercises at home and/ or physical therapy without satisfactory response and Repeat series performed because patient had significant improvement in their pain and functional capacity from prior series which was given more than six months ago. \par \par An injection of Euflexxa 2ml #2 was injected into the left knee(s). after verbal consent using sterile technique. The risks, benefits, and alternatives to Viscosupplementation injection were explained in full to the patient. Risks outlined include but are not limited to infection, sepsis, bleeding, scarring, skin discoloration, temporary increase in pain, syncopal episode, failure to resolve symptoms, allergic reaction, and symptom recurrence. Signs and symptoms of infection reviewed and patient advised to call immediately for redness, fevers, and/or chills. Patient understood the risks. All questions were answered. After discussion of options, patient requested Viscosupplementation. The patient tolerated the procedure well. Ice tonight to the injection site. \par

## 2023-07-27 NOTE — HISTORY OF PRESENT ILLNESS
[Right Leg] : right leg [Work related] : work related [6] : 6 [5] : 5 [Localized] : localized [Intermittent] : intermittent [Work] : work [Sleep] : sleep [Rest] : rest [Physical therapy] : physical therapy [Walking] : walking [Stairs] : stairs [Not working due to injury] : Work status: not working due to injury [de-identified] : WC 4/8/19 BILATERAL KNEES\par \par S/P RIGHT TKA 1/6/21. STILL STIFFNESS AND WEAKNESS TO BOTH LEGS. PAIN IS PROGRESSING TO LEFT KNEE AND BACK PAIN IS PROGRESSING AS WELL. DOING HEP WITHOUT RELIEF. \par \par \par  FOLLOW UP WORKERS COMP RIGHT KNEE SOME STIFFNESS ,OUT OF WORK \par HAVING LEFT KNEE PAIN STILL AND BACK PAIN TO BOTH LEGS\par \par 07/13/23 HERE TO START LEFT KNEE EUFLEXXA # 1 \par \par 07/20/23 LEFT KNEE EUFLEXXA # 2 \par \par 07/27/23 right knee euflexxa # 3  [] : Post Surgical Visit: no [FreeTextEntry1] : knee [FreeTextEntry3] : 04/08/2019 [FreeTextEntry5] : work injury. Pt states her left knee was in a lot of pain last night and was unable to sleep. [FreeTextEntry6] : STIFFNESS [FreeTextEntry7] : ANKLE [FreeTextEntry9] : Tylenol  [de-identified] : left knee euflexxa injections

## 2023-07-27 NOTE — ASSESSMENT
[FreeTextEntry1] : WC 4/8/19:\par S/P RIGHT TKA 1/6/21. SHE IS DOING WELL. STILL SIG WEAKNESS QUAD MUSCLE. LEFT KNEE PAIN CONTINUES AS WELL. CONTINUED RIGHT KNEE PHYSICAL THERAPY IS MEDICALLY NECESSARY TO AID IN PAIN AND FUNCTION\par \par LEFT KNEE PAIN IS PROGRESSING AND SHE IS EXPERIENCING WEAKNESS AS WELL. TREATMENT OF LEFT KNEE IS MEDICALLY NECESSARY AS A CONSEQUENTIAL TO ALTERED GAIT FROM RIGHT KNEE INJURY. [employer] IS HELPING SLIGHTLY.  LT KNEE PAIN IS CONSEQUENTIALLY RELATED TO RT KNEE INJURY. \par \par NOW DEVELOPING PAIN I BACK AND LLE RADICULAR PAIN DUE TO ALTERED GAIT. MRI WITH MULTILEVEL DDD AND STENOSIS\par LUMBAR PT IS MEDICALLY NECESSARY. LUMBAR PAIN IS CONSEQUENTIALLY RELATED TO ALTERED GAIT FROM RT KNEE INJURY.\par RECOMMEND PAIN MANAGEMENT CONSULT TO CONSIDER LESI\par \par LT KNEE EUFLEXXA #2 TODAY. PATIENT TOLERATED INJECTION WELL.

## 2023-07-27 NOTE — PHYSICAL EXAM
[Flexion] : flexion [Extension] : extension [Bending to left] : bending to left [Bending to right] : bending to right [Right] : right knee [4___] : hamstring 4[unfilled]/5 [Left] : left knee [5___] : hamstring 5[unfilled]/5 [FreeTextEntry9] : PAIN WITH RESISTED STRAIGHT LEG RAISE RIGHT .  [] : no tenderness [TWNoteComboBox7] : flexion 115 degrees [de-identified] : extension 0 degrees

## 2023-09-14 ENCOUNTER — NON-APPOINTMENT (OUTPATIENT)
Age: 73
End: 2023-09-14

## 2023-09-14 ENCOUNTER — APPOINTMENT (OUTPATIENT)
Dept: ORTHOPEDIC SURGERY | Facility: CLINIC | Age: 73
End: 2023-09-14
Payer: OTHER MISCELLANEOUS

## 2023-09-14 VITALS — HEIGHT: 63 IN | WEIGHT: 173 LBS | BODY MASS INDEX: 30.65 KG/M2

## 2023-09-14 DIAGNOSIS — M54.16 RADICULOPATHY, LUMBAR REGION: ICD-10-CM

## 2023-09-14 PROCEDURE — 99214 OFFICE O/P EST MOD 30 MIN: CPT

## 2023-10-12 ENCOUNTER — NON-APPOINTMENT (OUTPATIENT)
Age: 73
End: 2023-10-12

## 2023-10-12 ENCOUNTER — APPOINTMENT (OUTPATIENT)
Dept: ORTHOPEDIC SURGERY | Facility: CLINIC | Age: 73
End: 2023-10-12
Payer: OTHER MISCELLANEOUS

## 2023-10-12 PROCEDURE — 99214 OFFICE O/P EST MOD 30 MIN: CPT

## 2023-10-12 RX ORDER — CYCLOBENZAPRINE HYDROCHLORIDE 10 MG/1
10 TABLET, FILM COATED ORAL
Qty: 30 | Refills: 0 | Status: ACTIVE | COMMUNITY
Start: 2023-10-12 | End: 1900-01-01

## 2023-10-12 RX ORDER — MELOXICAM 15 MG/1
15 TABLET ORAL
Qty: 30 | Refills: 2 | Status: ACTIVE | COMMUNITY
Start: 2023-10-12 | End: 1900-01-01

## 2023-10-27 ENCOUNTER — APPOINTMENT (OUTPATIENT)
Dept: ORTHOPEDIC SURGERY | Facility: CLINIC | Age: 73
End: 2023-10-27
Payer: OTHER MISCELLANEOUS

## 2023-10-27 PROCEDURE — 99075 MEDICAL TESTIMONY: CPT

## 2023-11-30 ENCOUNTER — NON-APPOINTMENT (OUTPATIENT)
Age: 73
End: 2023-11-30

## 2023-11-30 ENCOUNTER — APPOINTMENT (OUTPATIENT)
Dept: ORTHOPEDIC SURGERY | Facility: CLINIC | Age: 73
End: 2023-11-30
Payer: OTHER MISCELLANEOUS

## 2023-11-30 VITALS — HEIGHT: 63 IN | BODY MASS INDEX: 30.65 KG/M2 | WEIGHT: 173 LBS

## 2023-11-30 DIAGNOSIS — M54.50 LOW BACK PAIN, UNSPECIFIED: ICD-10-CM

## 2023-11-30 PROCEDURE — 99214 OFFICE O/P EST MOD 30 MIN: CPT

## 2023-11-30 RX ORDER — GABAPENTIN 300 MG/1
300 CAPSULE ORAL 3 TIMES DAILY
Qty: 90 | Refills: 1 | Status: ACTIVE | COMMUNITY
Start: 2023-11-30 | End: 1900-01-01

## 2024-02-01 ENCOUNTER — NON-APPOINTMENT (OUTPATIENT)
Age: 74
End: 2024-02-01

## 2024-02-01 ENCOUNTER — APPOINTMENT (OUTPATIENT)
Dept: MRI IMAGING | Facility: CLINIC | Age: 74
End: 2024-02-01
Payer: OTHER MISCELLANEOUS

## 2024-02-01 ENCOUNTER — APPOINTMENT (OUTPATIENT)
Dept: ORTHOPEDIC SURGERY | Facility: CLINIC | Age: 74
End: 2024-02-01
Payer: OTHER MISCELLANEOUS

## 2024-02-01 DIAGNOSIS — M54.16 RADICULOPATHY, LUMBAR REGION: ICD-10-CM

## 2024-02-01 PROCEDURE — 99214 OFFICE O/P EST MOD 30 MIN: CPT

## 2024-02-01 PROCEDURE — 72148 MRI LUMBAR SPINE W/O DYE: CPT

## 2024-02-01 NOTE — PHYSICAL EXAM
[Flexion] : flexion [Extension] : extension [Bending to left] : bending to left [Bending to right] : bending to right [Right] : right knee [4___] : hamstring 4[unfilled]/5 [Left] : left knee [5___] : hamstring 5[unfilled]/5 [FreeTextEntry9] : PAIN WITH RESISTED STRAIGHT LEG RAISE RIGHT .  [] : no tenderness [TWNoteComboBox7] : flexion 115 degrees [de-identified] : extension 0 degrees

## 2024-02-01 NOTE — DISCUSSION/SUMMARY
[de-identified] : CONTINUES PAIN TO BACK WITH RADICULAR SYMPTOMS LEFT > RIGHT.  WILL REQUEST TREATMENT, PT, AND MRI LUMBAR SPINE. SCHEDULED TODAY.

## 2024-02-01 NOTE — HISTORY OF PRESENT ILLNESS
[Right Leg] : right leg [Work related] : work related [8] : 8 [7] : 7 [Dull/Aching] : dull/aching [Localized] : localized [Tightness] : tightness [Intermittent] : intermittent [Work] : work [Sleep] : sleep [Rest] : rest [Physical therapy] : physical therapy [Walking] : walking [Stairs] : stairs [Not working due to injury] : Work status: not working due to injury [de-identified] : WC 4/8/19 BILATERAL KNEES  CONTINUED SEVERE PAIN TO BACK AND DOWN BOTH LEGS WITH PARESTEHSIAS LEFT > RIGHT   02/01/24 follow up workers comp  bl knees .  [] : Post Surgical Visit: no [FreeTextEntry1] : knee [FreeTextEntry3] : 04/08/2019 [FreeTextEntry5] : work injury. Pt states her left knee was in a lot of pain last night and was unable to sleep. [FreeTextEntry6] : STIFFNESS [FreeTextEntry7] : ANKLE [FreeTextEntry9] : Tylenol  [de-identified] : left knee euflexxa injections

## 2024-02-08 ENCOUNTER — APPOINTMENT (OUTPATIENT)
Dept: ORTHOPEDIC SURGERY | Facility: CLINIC | Age: 74
End: 2024-02-08

## 2024-04-04 ENCOUNTER — APPOINTMENT (OUTPATIENT)
Dept: ORTHOPEDIC SURGERY | Facility: CLINIC | Age: 74
End: 2024-04-04
Payer: OTHER MISCELLANEOUS

## 2024-04-04 ENCOUNTER — NON-APPOINTMENT (OUTPATIENT)
Age: 74
End: 2024-04-04

## 2024-04-04 VITALS — HEIGHT: 63 IN | BODY MASS INDEX: 30.12 KG/M2 | WEIGHT: 170 LBS

## 2024-04-04 PROCEDURE — 20610 DRAIN/INJ JOINT/BURSA W/O US: CPT | Mod: LT

## 2024-04-04 PROCEDURE — 73564 X-RAY EXAM KNEE 4 OR MORE: CPT | Mod: LT

## 2024-04-04 PROCEDURE — 99214 OFFICE O/P EST MOD 30 MIN: CPT | Mod: 25

## 2024-04-04 PROCEDURE — J3490M: CUSTOM

## 2024-04-04 NOTE — PROCEDURE
[de-identified] : Procedure Name: Large Joint Injection / Aspiration: Depomedrol and Marcaine Anesthesia: ethyl chloride sprayed topically..  Depomedrol: An injection of Depomedrol 80 mg , 1 cc.  Marcaine: 5 cc.  Medication was injected in the left knee. Patient has tried OTC's including aspirin, Ibuprofen, Aleve etc or prescription NSAIDS, and/or exercises at home and/ or physical therapy without satisfactory response. After verbal consent using sterile preparation and technique. The risks, benefits, and alternatives to cortisone injection were explained in full to the patient. Risks outlined include but are not limited to infection, sepsis, bleeding, scarring, skin discoloration, temporary  increase in pain, syncopal episode, failure to resolve symptoms, allergic reaction, symptom recurrence, and elevation of blood sugar in diabetics. Patient understood the risks. All questions were answered. After discussion of options, patient requested an injection. Oral informed consent was obtained and sterile prep was done of the injection site. Sterile technique was utilized for the procedure including the preparation of the solutions used for the injection. Patient tolerated the procedure well. Advised to ice the injection site this evening. Prep with alcohol locally to site. Sterile technique used. Post Procedure Instructions: Patient was advised to call if redness, pain, or fever occur and apply ice for 15 min. out of every hour for the next 12-24 hours as tolerated.

## 2024-04-04 NOTE — DISCUSSION/SUMMARY
[de-identified] : CONTINUES PAIN TO BACK WITH RADICULAR SYMPTOMS LEFT > RIGHT.  WILL REQUEST TREATMENT, PT, AND MRI LUMBAR SPINE. SCHEDULED TODAY.

## 2024-04-04 NOTE — HISTORY OF PRESENT ILLNESS
[Right Leg] : right leg [Work related] : work related [8] : 8 [7] : 7 [Dull/Aching] : dull/aching [Localized] : localized [Tightness] : tightness [Intermittent] : intermittent [Work] : work [Sleep] : sleep [Rest] : rest [Physical therapy] : physical therapy [Walking] : walking [Stairs] : stairs [Not working due to injury] : Work status: not working due to injury [de-identified] : WC 4/8/19 BILATERAL KNEES  CONTINUED SEVERE PAIN TO BACK AND DOWN BOTH LEGS WITH PARESTEHSIAS LEFT > RIGHT   02/01/24 follow up workers comp  bl knees .   4.4.24 FOLLOW UP OF THE RIGHT KNEE [] : Post Surgical Visit: no [FreeTextEntry1] : knee [FreeTextEntry3] : 04/08/2019 [FreeTextEntry5] : work injury. Pt states her left knee was in a lot of pain last night and was unable to sleep. [FreeTextEntry6] : STIFFNESS [FreeTextEntry7] : ANKLE [FreeTextEntry9] : Tylenol  [de-identified] : left knee euflexxa injections

## 2024-04-04 NOTE — PHYSICAL EXAM
[Flexion] : flexion [Extension] : extension [Bending to left] : bending to left [Bending to right] : bending to right [Right] : right knee [4___] : hamstring 4[unfilled]/5 [Left] : left knee [5___] : hamstring 5[unfilled]/5 [FreeTextEntry9] : PAIN WITH RESISTED STRAIGHT LEG RAISE RIGHT .  [] : no tenderness [TWNoteComboBox7] : flexion 115 degrees [de-identified] : extension 0 degrees

## 2024-05-23 ENCOUNTER — NON-APPOINTMENT (OUTPATIENT)
Age: 74
End: 2024-05-23

## 2024-05-23 ENCOUNTER — APPOINTMENT (OUTPATIENT)
Dept: ORTHOPEDIC SURGERY | Facility: CLINIC | Age: 74
End: 2024-05-23
Payer: OTHER MISCELLANEOUS

## 2024-05-23 DIAGNOSIS — M17.12 UNILATERAL PRIMARY OSTEOARTHRITIS, LEFT KNEE: ICD-10-CM

## 2024-05-23 DIAGNOSIS — Z96.651 PRESENCE OF RIGHT ARTIFICIAL KNEE JOINT: ICD-10-CM

## 2024-05-23 PROCEDURE — 99214 OFFICE O/P EST MOD 30 MIN: CPT

## 2024-05-23 NOTE — PHYSICAL EXAM
[Right] : right knee [4___] : hamstring 4[unfilled]/5 [Left] : left knee [5___] : hamstring 5[unfilled]/5 [] : ambulation with cane [TWNoteComboBox7] : flexion 115 degrees [de-identified] : extension 0 degrees

## 2024-05-23 NOTE — HISTORY OF PRESENT ILLNESS
[Right Leg] : right leg [Work related] : work related [8] : 8 [7] : 7 [Dull/Aching] : dull/aching [Localized] : localized [Tightness] : tightness [Intermittent] : intermittent [Work] : work [Sleep] : sleep [Rest] : rest [Physical therapy] : physical therapy [Walking] : walking [Stairs] : stairs [Not working due to injury] : Work status: not working due to injury [de-identified] : WC 4/8/19 BILATERAL KNEES  CONTINUED SEVERE PAIN TO BACK AND DOWN BOTH LEGS WITH PARESTEHSIAS LEFT > RIGHT   02/01/24 follow up workers comp  bl knees .   4.4.24 FOLLOW UP OF THE RIGHT KNEE 5/23/24: FU B KNEES. RIGHT KNEE IS DOING WELL. L KNEE CONTINUES TO GIVE HER PAIN. CSI 6 WEEKS AGO WITH MINIMAL RELIEF. [] : Post Surgical Visit: no [FreeTextEntry1] : knee [FreeTextEntry3] : 04/08/2019 [FreeTextEntry5] : work injury. Pt states her left knee was in a lot of pain last night and was unable to sleep. [FreeTextEntry6] : STIFFNESS [FreeTextEntry7] : ANKLE [FreeTextEntry9] : Tylenol  [de-identified] : left knee euflexxa injections

## 2024-07-18 ENCOUNTER — APPOINTMENT (OUTPATIENT)
Dept: ORTHOPEDIC SURGERY | Facility: CLINIC | Age: 74
End: 2024-07-18
Payer: OTHER MISCELLANEOUS

## 2024-07-18 DIAGNOSIS — M17.12 UNILATERAL PRIMARY OSTEOARTHRITIS, LEFT KNEE: ICD-10-CM

## 2024-07-18 DIAGNOSIS — Z96.651 PRESENCE OF RIGHT ARTIFICIAL KNEE JOINT: ICD-10-CM

## 2024-07-18 DIAGNOSIS — M54.16 RADICULOPATHY, LUMBAR REGION: ICD-10-CM

## 2024-07-18 DIAGNOSIS — M48.061 SPINAL STENOSIS, LUMBAR REGION WITHOUT NEUROGENIC CLAUDICATION: ICD-10-CM

## 2024-07-18 PROCEDURE — 99214 OFFICE O/P EST MOD 30 MIN: CPT

## 2024-08-28 ENCOUNTER — NON-APPOINTMENT (OUTPATIENT)
Age: 74
End: 2024-08-28

## 2024-08-29 ENCOUNTER — APPOINTMENT (OUTPATIENT)
Dept: ORTHOPEDIC SURGERY | Facility: CLINIC | Age: 74
End: 2024-08-29
Payer: OTHER MISCELLANEOUS

## 2024-08-29 PROCEDURE — 20610 DRAIN/INJ JOINT/BURSA W/O US: CPT | Mod: LT

## 2024-08-29 PROCEDURE — 99214 OFFICE O/P EST MOD 30 MIN: CPT | Mod: 25

## 2024-08-29 NOTE — PROCEDURE
[de-identified] : Procedure Name: Euflexxa (Large Joint)  Viscosupplementation Injection: X-ray evidence of Osteoarthritis on this or prior visit, Patient has tried OTC's including aspirin, Ibuprofen, Aleve etc or prescription NSAIDS, and/or exercises at home and/ or physical therapy without satisfactory response and Repeat series performed because patient had significant improvement in their pain and functional capacity from prior series which was given more than six months ago.   An injection of Euflexxa 2ml #1 was injected into the left knee(s). after verbal consent using sterile technique. The risks, benefits, and alternatives to Viscosupplementation injection were explained in full to the patient. Risks outlined include but are not limited to infection, sepsis, bleeding, scarring, skin discoloration, temporary increase in pain, syncopal episode, failure to resolve symptoms, allergic reaction, and symptom recurrence. Signs and symptoms of infection reviewed and patient advised to call immediately for redness, fevers, and/or chills. Patient understood the risks. All questions were answered. After discussion of options, patient requested Viscosupplementation. The patient tolerated the procedure well. Ice tonight to the injection site.

## 2024-08-29 NOTE — HISTORY OF PRESENT ILLNESS
[Right Leg] : right leg [Work related] : work related [8] : 8 [7] : 7 [Dull/Aching] : dull/aching [Localized] : localized [Tightness] : tightness [Intermittent] : intermittent [Work] : work [Sleep] : sleep [Rest] : rest [Physical therapy] : physical therapy [Walking] : walking [Stairs] : stairs [Not working due to injury] : Work status: not working due to injury [de-identified] : WC 4/8/19 BILATERAL KNEES  CONTINUED SEVERE PAIN TO BACK AND DOWN BOTH LEGS WITH PARESTEHSIAS LEFT > RIGHT   02/01/24 follow up workers comp  bl knees .   4.4.24 FOLLOW UP OF THE RIGHT KNEE 5/23/24: FU B KNEES. RIGHT KNEE IS DOING WELL. L KNEE CONTINUES TO GIVE HER PAIN. CSI 6 WEEKS AGO WITH MINIMAL RELIEF.  7.18.24 PATIENT HERE FOR BILATERAL KNEE. SHE STATES THE PAIN COMES AND GOES, BUT STILL FEELS STIFFNESS  8.29.24 LEFT KNEE. EUFLEXXA #1 [] : Post Surgical Visit: no [FreeTextEntry1] : knee [FreeTextEntry3] : 04/08/2019 [FreeTextEntry5] : work injury. Pt states her left knee was in a lot of pain last night and was unable to sleep. [FreeTextEntry6] : STIFFNESS [FreeTextEntry7] : ANKLE [FreeTextEntry9] : Tylenol  [de-identified] : left knee euflexxa injections

## 2024-08-29 NOTE — PHYSICAL EXAM
[Right] : right knee [4___] : hamstring 4[unfilled]/5 [Left] : left knee [5___] : hamstring 5[unfilled]/5 [] : no tenderness [TWNoteComboBox7] : flexion 115 degrees [de-identified] : extension 0 degrees

## 2024-08-29 NOTE — ASSESSMENT
[FreeTextEntry1] : CSI WITH MINIMAL RELIEFIN THE PAST  DISCUSSED TKA VS REPEATING EUFLEXXA INJECTIONS. SHE WOULD LIKE TO TRY VISCO AGAIN BEFORE PROCEEDING WITH TKA. SHE IS OOW PT FOR HER L SPINE PROVIDED  MG-2 AND VARIANCE FOR CONTINUED THERAPY RX PROVIDED  LT KNEE EUFLEXXA #1 TODAY. PATIENT TOLERATED INJECTION WELL.

## 2024-09-05 ENCOUNTER — APPOINTMENT (OUTPATIENT)
Dept: ORTHOPEDIC SURGERY | Facility: CLINIC | Age: 74
End: 2024-09-05
Payer: OTHER MISCELLANEOUS

## 2024-09-05 PROCEDURE — 20610 DRAIN/INJ JOINT/BURSA W/O US: CPT | Mod: LT

## 2024-09-05 PROCEDURE — 99214 OFFICE O/P EST MOD 30 MIN: CPT | Mod: 25

## 2024-09-05 NOTE — ASSESSMENT
[FreeTextEntry1] : CSI WITH MINIMAL RELIEFIN THE PAST  DISCUSSED TKA VS REPEATING EUFLEXXA INJECTIONS. SHE WOULD LIKE TO TRY VISCO AGAIN BEFORE PROCEEDING WITH TKA. SHE IS OOW PT FOR HER L SPINE PROVIDED  MG-2 AND VARIANCE FOR CONTINUED THERAPY RX PROVIDED  LT KNEE EUFLEXXA #2 TODAY. PATIENT TOLERATED INJECTION WELL.

## 2024-09-05 NOTE — PROCEDURE
[de-identified] : Procedure Name: Euflexxa (Large Joint)  Viscosupplementation Injection: X-ray evidence of Osteoarthritis on this or prior visit, Patient has tried OTC's including aspirin, Ibuprofen, Aleve etc or prescription NSAIDS, and/or exercises at home and/ or physical therapy without satisfactory response and Repeat series performed because patient had significant improvement in their pain and functional capacity from prior series which was given more than six months ago.   An injection of Euflexxa 2ml #2 was injected into the left knee(s). after verbal consent using sterile technique. The risks, benefits, and alternatives to Viscosupplementation injection were explained in full to the patient. Risks outlined include but are not limited to infection, sepsis, bleeding, scarring, skin discoloration, temporary increase in pain, syncopal episode, failure to resolve symptoms, allergic reaction, and symptom recurrence. Signs and symptoms of infection reviewed and patient advised to call immediately for redness, fevers, and/or chills. Patient understood the risks. All questions were answered. After discussion of options, patient requested Viscosupplementation. The patient tolerated the procedure well. Ice tonight to the injection site.

## 2024-09-05 NOTE — HISTORY OF PRESENT ILLNESS
[Right Leg] : right leg [Work related] : work related [8] : 8 [7] : 7 [Dull/Aching] : dull/aching [Localized] : localized [Tightness] : tightness [Intermittent] : intermittent [Work] : work [Sleep] : sleep [Rest] : rest [Physical therapy] : physical therapy [Walking] : walking [Stairs] : stairs [Not working due to injury] : Work status: not working due to injury [de-identified] : WC 4/8/19 BILATERAL KNEES  CONTINUED SEVERE PAIN TO BACK AND DOWN BOTH LEGS WITH PARESTEHSIAS LEFT > RIGHT   02/01/24 follow up workers comp  bl knees .   4.4.24 FOLLOW UP OF THE RIGHT KNEE 5/23/24: FU B KNEES. RIGHT KNEE IS DOING WELL. L KNEE CONTINUES TO GIVE HER PAIN. CSI 6 WEEKS AGO WITH MINIMAL RELIEF.  7.18.24 PATIENT HERE FOR BILATERAL KNEE. SHE STATES THE PAIN COMES AND GOES, BUT STILL FEELS STIFFNESS  8.29.24 LEFT KNEE. EUFLEXXA #1  9.5.24 LEFT KNEE. EUFLEXXA #2 [] : Post Surgical Visit: no [FreeTextEntry1] : knee [FreeTextEntry3] : 04/08/2019 [FreeTextEntry5] : work injury. Pt states her left knee was in a lot of pain last night and was unable to sleep. [FreeTextEntry6] : STIFFNESS [FreeTextEntry7] : ANKLE [FreeTextEntry9] : Tylenol  [de-identified] : left knee euflexxa injections

## 2024-09-05 NOTE — PHYSICAL EXAM
[Right] : right knee [4___] : hamstring 4[unfilled]/5 [Left] : left knee [5___] : hamstring 5[unfilled]/5 [] : no tenderness [TWNoteComboBox7] : flexion 115 degrees [de-identified] : extension 0 degrees

## 2024-09-12 ENCOUNTER — APPOINTMENT (OUTPATIENT)
Dept: ORTHOPEDIC SURGERY | Facility: CLINIC | Age: 74
End: 2024-09-12
Payer: OTHER MISCELLANEOUS

## 2024-09-12 DIAGNOSIS — M17.12 UNILATERAL PRIMARY OSTEOARTHRITIS, LEFT KNEE: ICD-10-CM

## 2024-09-12 PROCEDURE — 20610 DRAIN/INJ JOINT/BURSA W/O US: CPT | Mod: LT

## 2024-09-12 NOTE — PROCEDURE
[de-identified] : Procedure Name: Euflexxa (Large Joint)  Viscosupplementation Injection: X-ray evidence of Osteoarthritis on this or prior visit, Patient has tried OTC's including aspirin, Ibuprofen, Aleve etc or prescription NSAIDS, and/or exercises at home and/ or physical therapy without satisfactory response and Repeat series performed because patient had significant improvement in their pain and functional capacity from prior series which was given more than six months ago.   An injection of Euflexxa 2ml #3 was injected into the left knee(s). after verbal consent using sterile technique. The risks, benefits, and alternatives to Viscosupplementation injection were explained in full to the patient. Risks outlined include but are not limited to infection, sepsis, bleeding, scarring, skin discoloration, temporary increase in pain, syncopal episode, failure to resolve symptoms, allergic reaction, and symptom recurrence. Signs and symptoms of infection reviewed and patient advised to call immediately for redness, fevers, and/or chills. Patient understood the risks. All questions were answered. After discussion of options, patient requested Viscosupplementation. The patient tolerated the procedure well. Ice tonight to the injection site.

## 2024-09-12 NOTE — PHYSICAL EXAM
[Right] : right knee [4___] : hamstring 4[unfilled]/5 [Left] : left knee [5___] : hamstring 5[unfilled]/5 [] : no tenderness [TWNoteComboBox7] : flexion 115 degrees [de-identified] : extension 0 degrees

## 2024-09-12 NOTE — ASSESSMENT
[FreeTextEntry1] : CSI WITH MINIMAL RELIEFIN THE PAST  DISCUSSED TKA VS REPEATING EUFLEXXA INJECTIONS. SHE WOULD LIKE TO TRY VISCO AGAIN BEFORE PROCEEDING WITH TKA. SHE IS OOW PT FOR HER L SPINE PROVIDED  MG-2 AND VARIANCE FOR CONTINUED THERAPY RX PROVIDED  LT KNEE EUFLEXXA #3 TODAY. PATIENT TOLERATED INJECTION WELL.  FOLLOW UP IN 4-6 WEEKS FOR RE EVALUATION.

## 2024-11-07 ENCOUNTER — NON-APPOINTMENT (OUTPATIENT)
Age: 74
End: 2024-11-07

## 2024-11-07 ENCOUNTER — APPOINTMENT (OUTPATIENT)
Dept: ORTHOPEDIC SURGERY | Facility: CLINIC | Age: 74
End: 2024-11-07
Payer: OTHER MISCELLANEOUS

## 2024-11-07 DIAGNOSIS — Z96.651 PRESENCE OF RIGHT ARTIFICIAL KNEE JOINT: ICD-10-CM

## 2024-11-07 DIAGNOSIS — M54.16 RADICULOPATHY, LUMBAR REGION: ICD-10-CM

## 2024-11-07 DIAGNOSIS — M17.12 UNILATERAL PRIMARY OSTEOARTHRITIS, LEFT KNEE: ICD-10-CM

## 2024-11-07 PROCEDURE — 73562 X-RAY EXAM OF KNEE 3: CPT | Mod: RT

## 2024-11-07 PROCEDURE — 99214 OFFICE O/P EST MOD 30 MIN: CPT

## 2025-01-16 ENCOUNTER — APPOINTMENT (OUTPATIENT)
Dept: ORTHOPEDIC SURGERY | Facility: CLINIC | Age: 75
End: 2025-01-16
Payer: OTHER MISCELLANEOUS

## 2025-01-16 DIAGNOSIS — M54.16 RADICULOPATHY, LUMBAR REGION: ICD-10-CM

## 2025-01-16 DIAGNOSIS — M48.061 SPINAL STENOSIS, LUMBAR REGION WITHOUT NEUROGENIC CLAUDICATION: ICD-10-CM

## 2025-01-16 DIAGNOSIS — Z96.651 PRESENCE OF RIGHT ARTIFICIAL KNEE JOINT: ICD-10-CM

## 2025-01-16 DIAGNOSIS — M17.12 UNILATERAL PRIMARY OSTEOARTHRITIS, LEFT KNEE: ICD-10-CM

## 2025-01-16 PROCEDURE — 99214 OFFICE O/P EST MOD 30 MIN: CPT

## 2025-01-16 PROCEDURE — G2211 COMPLEX E/M VISIT ADD ON: CPT | Mod: NC

## 2025-01-29 ENCOUNTER — NON-APPOINTMENT (OUTPATIENT)
Age: 75
End: 2025-01-29

## 2025-02-27 ENCOUNTER — APPOINTMENT (OUTPATIENT)
Dept: ORTHOPEDIC SURGERY | Facility: CLINIC | Age: 75
End: 2025-02-27
Payer: OTHER MISCELLANEOUS

## 2025-02-27 DIAGNOSIS — M17.12 UNILATERAL PRIMARY OSTEOARTHRITIS, LEFT KNEE: ICD-10-CM

## 2025-02-27 DIAGNOSIS — Z96.651 PRESENCE OF RIGHT ARTIFICIAL KNEE JOINT: ICD-10-CM

## 2025-02-27 DIAGNOSIS — M54.50 LOW BACK PAIN, UNSPECIFIED: ICD-10-CM

## 2025-02-27 PROCEDURE — G2211 COMPLEX E/M VISIT ADD ON: CPT | Mod: NC

## 2025-02-27 PROCEDURE — 99214 OFFICE O/P EST MOD 30 MIN: CPT

## 2025-02-28 ENCOUNTER — APPOINTMENT (OUTPATIENT)
Dept: ORTHOPEDIC SURGERY | Facility: AMBULATORY SURGERY CENTER | Age: 75
End: 2025-02-28
Payer: OTHER MISCELLANEOUS

## 2025-02-28 PROCEDURE — 99075 MEDICAL TESTIMONY: CPT

## 2025-04-17 ENCOUNTER — APPOINTMENT (OUTPATIENT)
Dept: ORTHOPEDIC SURGERY | Facility: CLINIC | Age: 75
End: 2025-04-17
Payer: OTHER MISCELLANEOUS

## 2025-04-17 DIAGNOSIS — M17.12 UNILATERAL PRIMARY OSTEOARTHRITIS, LEFT KNEE: ICD-10-CM

## 2025-04-17 DIAGNOSIS — M54.16 RADICULOPATHY, LUMBAR REGION: ICD-10-CM

## 2025-04-17 DIAGNOSIS — M54.50 LOW BACK PAIN, UNSPECIFIED: ICD-10-CM

## 2025-04-17 DIAGNOSIS — Z96.651 PRESENCE OF RIGHT ARTIFICIAL KNEE JOINT: ICD-10-CM

## 2025-04-17 DIAGNOSIS — M48.061 SPINAL STENOSIS, LUMBAR REGION WITHOUT NEUROGENIC CLAUDICATION: ICD-10-CM

## 2025-04-17 PROCEDURE — G2211 COMPLEX E/M VISIT ADD ON: CPT | Mod: NC

## 2025-04-17 PROCEDURE — 73564 X-RAY EXAM KNEE 4 OR MORE: CPT | Mod: RT

## 2025-04-17 PROCEDURE — 99214 OFFICE O/P EST MOD 30 MIN: CPT

## 2025-06-05 ENCOUNTER — APPOINTMENT (OUTPATIENT)
Dept: ORTHOPEDIC SURGERY | Facility: CLINIC | Age: 75
End: 2025-06-05
Payer: OTHER MISCELLANEOUS

## 2025-06-05 DIAGNOSIS — M54.16 RADICULOPATHY, LUMBAR REGION: ICD-10-CM

## 2025-06-05 PROCEDURE — 99214 OFFICE O/P EST MOD 30 MIN: CPT

## 2025-07-24 ENCOUNTER — NON-APPOINTMENT (OUTPATIENT)
Age: 75
End: 2025-07-24

## 2025-07-24 ENCOUNTER — APPOINTMENT (OUTPATIENT)
Dept: ORTHOPEDIC SURGERY | Facility: CLINIC | Age: 75
End: 2025-07-24
Payer: OTHER MISCELLANEOUS

## 2025-07-24 DIAGNOSIS — Z96.651 PRESENCE OF RIGHT ARTIFICIAL KNEE JOINT: ICD-10-CM

## 2025-07-24 DIAGNOSIS — Z86.79 PERSONAL HISTORY OF OTHER DISEASES OF THE CIRCULATORY SYSTEM: ICD-10-CM

## 2025-07-24 DIAGNOSIS — M48.061 SPINAL STENOSIS, LUMBAR REGION WITHOUT NEUROGENIC CLAUDICATION: ICD-10-CM

## 2025-07-24 DIAGNOSIS — M17.12 UNILATERAL PRIMARY OSTEOARTHRITIS, LEFT KNEE: ICD-10-CM

## 2025-07-24 PROCEDURE — 99214 OFFICE O/P EST MOD 30 MIN: CPT

## 2025-07-24 PROCEDURE — G2211 COMPLEX E/M VISIT ADD ON: CPT | Mod: NC

## 2025-09-04 ENCOUNTER — APPOINTMENT (OUTPATIENT)
Dept: ORTHOPEDIC SURGERY | Facility: CLINIC | Age: 75
End: 2025-09-04
Payer: OTHER MISCELLANEOUS

## 2025-09-04 DIAGNOSIS — Z96.651 PRESENCE OF RIGHT ARTIFICIAL KNEE JOINT: ICD-10-CM

## 2025-09-04 DIAGNOSIS — M17.12 UNILATERAL PRIMARY OSTEOARTHRITIS, LEFT KNEE: ICD-10-CM

## 2025-09-04 DIAGNOSIS — M54.50 LOW BACK PAIN, UNSPECIFIED: ICD-10-CM

## 2025-09-04 PROCEDURE — 99214 OFFICE O/P EST MOD 30 MIN: CPT

## 2025-09-09 ENCOUNTER — APPOINTMENT (OUTPATIENT)
Dept: ORTHOPEDIC SURGERY | Facility: CLINIC | Age: 75
End: 2025-09-09